# Patient Record
Sex: FEMALE | Race: WHITE | NOT HISPANIC OR LATINO | Employment: OTHER | URBAN - NONMETROPOLITAN AREA
[De-identification: names, ages, dates, MRNs, and addresses within clinical notes are randomized per-mention and may not be internally consistent; named-entity substitution may affect disease eponyms.]

---

## 2018-05-17 ENCOUNTER — INITIAL PRENATAL (OUTPATIENT)
Dept: OBSTETRICS AND GYNECOLOGY | Facility: CLINIC | Age: 43
End: 2018-05-17

## 2018-05-17 ENCOUNTER — APPOINTMENT (OUTPATIENT)
Dept: LAB | Facility: HOSPITAL | Age: 43
End: 2018-05-17

## 2018-05-17 VITALS
WEIGHT: 171.6 LBS | SYSTOLIC BLOOD PRESSURE: 138 MMHG | DIASTOLIC BLOOD PRESSURE: 76 MMHG | HEIGHT: 69 IN | BODY MASS INDEX: 25.42 KG/M2

## 2018-05-17 DIAGNOSIS — O09.522 ELDERLY MULTIGRAVIDA IN SECOND TRIMESTER: ICD-10-CM

## 2018-05-17 DIAGNOSIS — Z3A.14 14 WEEKS GESTATION OF PREGNANCY: ICD-10-CM

## 2018-05-17 DIAGNOSIS — Z64.1 GRAND MULTIPARA: Chronic | ICD-10-CM

## 2018-05-17 DIAGNOSIS — Z32.01 PREGNANCY EXAMINATION OR TEST, POSITIVE RESULT: Primary | ICD-10-CM

## 2018-05-17 DIAGNOSIS — O99.012 ANEMIA DURING PREGNANCY IN SECOND TRIMESTER: ICD-10-CM

## 2018-05-17 PROBLEM — O09.529 AMA (ADVANCED MATERNAL AGE) MULTIGRAVIDA 35+: Status: ACTIVE | Noted: 2018-05-17

## 2018-05-17 PROBLEM — O99.019 ANEMIA OF PREGNANCY: Status: ACTIVE | Noted: 2018-05-17

## 2018-05-17 LAB
ABO GROUP BLD: NORMAL
ALBUMIN SERPL-MCNC: 4.2 G/DL (ref 3.4–4.8)
ALBUMIN/GLOB SERPL: 1.2 G/DL (ref 1.1–1.8)
ALP SERPL-CCNC: 37 U/L (ref 38–126)
ALT SERPL W P-5'-P-CCNC: 32 U/L (ref 9–52)
AMPHET+METHAMPHET UR QL: NEGATIVE
ANION GAP SERPL CALCULATED.3IONS-SCNC: 9 MMOL/L (ref 5–15)
AST SERPL-CCNC: 23 U/L (ref 14–36)
BARBITURATES UR QL SCN: NEGATIVE
BASOPHILS # BLD AUTO: 0.01 10*3/MM3 (ref 0–0.2)
BASOPHILS NFR BLD AUTO: 0.1 % (ref 0–2)
BENZODIAZ UR QL SCN: NEGATIVE
BILIRUB SERPL-MCNC: 0.2 MG/DL (ref 0.2–1.3)
BILIRUB UR QL STRIP: NEGATIVE
BLD GP AB SCN SERPL QL: NEGATIVE
BUN BLD-MCNC: 13 MG/DL (ref 7–21)
BUN/CREAT SERPL: 22.8 (ref 7–25)
CALCIUM SPEC-SCNC: 10 MG/DL (ref 8.4–10.2)
CANNABINOIDS SERPL QL: NEGATIVE
CHLORIDE SERPL-SCNC: 102 MMOL/L (ref 95–110)
CLARITY UR: ABNORMAL
CO2 SERPL-SCNC: 26 MMOL/L (ref 22–31)
COCAINE UR QL: NEGATIVE
COLOR UR: YELLOW
CREAT BLD-MCNC: 0.57 MG/DL (ref 0.5–1)
DEPRECATED RDW RBC AUTO: 39 FL (ref 36.4–46.3)
EOSINOPHIL # BLD AUTO: 0.07 10*3/MM3 (ref 0–0.7)
EOSINOPHIL NFR BLD AUTO: 0.9 % (ref 0–7)
ERYTHROCYTE [DISTWIDTH] IN BLOOD BY AUTOMATED COUNT: 13 % (ref 11.5–14.5)
GFR SERPL CREATININE-BSD FRML MDRD: 116 ML/MIN/1.73 (ref 60–135)
GLOBULIN UR ELPH-MCNC: 3.4 GM/DL (ref 2.3–3.5)
GLUCOSE BLD-MCNC: 100 MG/DL (ref 60–100)
GLUCOSE UR STRIP-MCNC: NEGATIVE MG/DL
HCG INTACT+B SERPL-ACNC: ABNORMAL MIU/ML
HCT VFR BLD AUTO: 36 % (ref 35–45)
HGB BLD-MCNC: 12.7 G/DL (ref 12–15.5)
HGB UR QL STRIP.AUTO: NEGATIVE
IMM GRANULOCYTES # BLD: 0.03 10*3/MM3 (ref 0–0.02)
IMM GRANULOCYTES NFR BLD: 0.4 % (ref 0–0.5)
KETONES UR QL STRIP: NEGATIVE
LEUKOCYTE ESTERASE UR QL STRIP.AUTO: ABNORMAL
LYMPHOCYTES # BLD AUTO: 1.66 10*3/MM3 (ref 0.6–4.2)
LYMPHOCYTES NFR BLD AUTO: 20.7 % (ref 10–50)
Lab: NORMAL
MCH RBC QN AUTO: 29.1 PG (ref 26.5–34)
MCHC RBC AUTO-ENTMCNC: 35.3 G/DL (ref 31.4–36)
MCV RBC AUTO: 82.4 FL (ref 80–98)
METHADONE UR QL SCN: NEGATIVE
MONOCYTES # BLD AUTO: 0.6 10*3/MM3 (ref 0–0.9)
MONOCYTES NFR BLD AUTO: 7.5 % (ref 0–12)
NEUTROPHILS # BLD AUTO: 5.65 10*3/MM3 (ref 2–8.6)
NEUTROPHILS NFR BLD AUTO: 70.4 % (ref 37–80)
NITRITE UR QL STRIP: NEGATIVE
OPIATES UR QL: NEGATIVE
OXYCODONE UR QL SCN: NEGATIVE
PH UR STRIP.AUTO: 6.5 [PH] (ref 5–9)
PLATELET # BLD AUTO: 258 10*3/MM3 (ref 150–450)
PMV BLD AUTO: 9 FL (ref 8–12)
POTASSIUM BLD-SCNC: 3.8 MMOL/L (ref 3.5–5.1)
PROT SERPL-MCNC: 7.6 G/DL (ref 6.3–8.6)
PROT UR QL STRIP: NEGATIVE
RBC # BLD AUTO: 4.37 10*6/MM3 (ref 3.77–5.16)
RH BLD: POSITIVE
SODIUM BLD-SCNC: 137 MMOL/L (ref 137–145)
SP GR UR STRIP: 1.02 (ref 1–1.03)
TSH SERPL DL<=0.05 MIU/L-ACNC: 1.53 MIU/ML (ref 0.46–4.68)
UROBILINOGEN UR QL STRIP: ABNORMAL
WBC NRBC COR # BLD: 8.02 10*3/MM3 (ref 3.2–9.8)

## 2018-05-17 PROCEDURE — 80307 DRUG TEST PRSMV CHEM ANLYZR: CPT | Performed by: OBSTETRICS & GYNECOLOGY

## 2018-05-17 PROCEDURE — G0432 EIA HIV-1/HIV-2 SCREEN: HCPCS | Performed by: OBSTETRICS & GYNECOLOGY

## 2018-05-17 PROCEDURE — 81003 URINALYSIS AUTO W/O SCOPE: CPT | Performed by: OBSTETRICS & GYNECOLOGY

## 2018-05-17 PROCEDURE — 86803 HEPATITIS C AB TEST: CPT | Performed by: OBSTETRICS & GYNECOLOGY

## 2018-05-17 PROCEDURE — 87086 URINE CULTURE/COLONY COUNT: CPT | Performed by: OBSTETRICS & GYNECOLOGY

## 2018-05-17 PROCEDURE — 84443 ASSAY THYROID STIM HORMONE: CPT | Performed by: OBSTETRICS & GYNECOLOGY

## 2018-05-17 PROCEDURE — 84702 CHORIONIC GONADOTROPIN TEST: CPT | Performed by: OBSTETRICS & GYNECOLOGY

## 2018-05-17 PROCEDURE — 99203 OFFICE O/P NEW LOW 30 MIN: CPT | Performed by: OBSTETRICS & GYNECOLOGY

## 2018-05-17 PROCEDURE — 80053 COMPREHEN METABOLIC PANEL: CPT | Performed by: OBSTETRICS & GYNECOLOGY

## 2018-05-17 PROCEDURE — 36415 COLL VENOUS BLD VENIPUNCTURE: CPT | Performed by: OBSTETRICS & GYNECOLOGY

## 2018-05-17 RX ORDER — FERROUS SULFATE 325(65) MG
325 TABLET ORAL
Qty: 90 TABLET | Refills: 11 | Status: SHIPPED | OUTPATIENT
Start: 2018-05-17 | End: 2020-02-20

## 2018-05-17 RX ORDER — PRENATAL VIT NO.126/IRON/FOLIC 28MG-0.8MG
TABLET ORAL DAILY
COMMUNITY

## 2018-05-18 LAB
BACTERIA SPEC AEROBE CULT: NORMAL
HBV SURFACE AG SERPL QL IA: NEGATIVE
HCV AB SER DONR QL: NEGATIVE
HIV1+2 AB SER QL: NEGATIVE
RPR SER QL: NORMAL
RUBV IGG SER QL: ABNORMAL
RUBV IGG SER-ACNC: 21 IU/ML (ref 0–9.9)

## 2018-05-18 NOTE — PROGRESS NOTES
Chief Complaint   Patient presents with   • High Risk Gestation       Nuha Bridges is a 43 y.o. year old G(14)P(12)01(12).  Patient's last menstrual period was 02/07/2018 (exact date).  She presents to be seen to initiate prenatal care.  All of her births been uncomplicated vaginal deliveries at home.    Last week she felt dizziness and weakness and faintness.  She had an unwell feeling that was different from her usual pregnancy discomforts.    We discussed checking some lab work and taking iron supplementation.  We also discussed treatments for constipation.    She will get the blood work today, May 17, 2018, and follow-up in 4 weeks.  If she is feeling better at that point and her lab work is normal, she plans on a home birth.    Smoking status: Never Smoker                                                              Smokeless tobacco: Never Used                          The following portions of the patient's history were reviewed and updated as appropriate:vital signs, allergies, current medications, past family history, past medical history, past social history, past surgical history and problem list.    Lab Review   No data reviewed    Imaging   No data reviewed    Assessment/Plan   ASSESSMENT  1. IUP at 14w1d  Nuah was seen today for high risk gestation.    Diagnoses and all orders for this visit:    Pregnancy examination or test, positive result  -     OB Panel With HIV  -     Urinalysis - Urine, Clean Catch  -     Urine Culture - Urine, Urine, Clean Catch  -     Urine Drug Screen - Urine, Clean Catch  -     TSH  -     Comprehensive Metabolic Panel  -     hCG, Quantitative, Pregnancy  -     RPR  -     CBC Auto Differential  -     Hepatitis B Surface Antigen  -     Rubella Antibody, IgG  -     OB Panel Type & Screen  -     HIV-1 & HIV-2 Antibodies  -     Hepatitis C Antibody  -     PREVIOUS HISTORY; Future  -     PREVIOUS HISTORY    14 weeks gestation of pregnancy    Elderly multigravida in second  trimester    Grand multipara    Anemia during pregnancy in second trimester    Other orders  -     ferrous sulfate 325 (65 FE) MG tablet; Take 1 tablet by mouth 3 (Three) Times a Day With Meals.        PLAN  1. Tests ordered today:  Orders Placed This Encounter   Procedures   • Urine Culture - Urine, Urine, Clean Catch   • OB Panel With HIV   • Urinalysis - Urine, Clean Catch   • Urine Drug Screen - Urine, Clean Catch   • TSH   • Comprehensive Metabolic Panel   • hCG, Quantitative, Pregnancy   • RPR   • CBC Auto Differential   • Hepatitis B Surface Antigen   • Rubella Antibody, IgG   • HIV-1 & HIV-2 Antibodies   • Hepatitis C Antibody   • OB Panel Type & Screen   • PREVIOUS HISTORY     Standing Status:   Future     Number of Occurrences:   1     Standing Expiration Date:   5/17/2019     2. Medications prescribed today:  New Medications Ordered This Visit   Medications   • ferrous sulfate 325 (65 FE) MG tablet     Sig: Take 1 tablet by mouth 3 (Three) Times a Day With Meals.     Dispense:  90 tablet     Refill:  11       Follow up: 4 week(s)       This note was electronically signed.    Aly Veloz MD  May 17, 2018

## 2018-06-11 ENCOUNTER — ROUTINE PRENATAL (OUTPATIENT)
Dept: OBSTETRICS AND GYNECOLOGY | Facility: CLINIC | Age: 43
End: 2018-06-11

## 2018-06-11 VITALS — WEIGHT: 171 LBS | DIASTOLIC BLOOD PRESSURE: 70 MMHG | SYSTOLIC BLOOD PRESSURE: 106 MMHG | BODY MASS INDEX: 25.25 KG/M2

## 2018-06-11 DIAGNOSIS — Z64.1 GRAND MULTIPARA: Chronic | ICD-10-CM

## 2018-06-11 DIAGNOSIS — O99.012 ANEMIA DURING PREGNANCY IN SECOND TRIMESTER: ICD-10-CM

## 2018-06-11 DIAGNOSIS — Z3A.17 17 WEEKS GESTATION OF PREGNANCY: Primary | ICD-10-CM

## 2018-06-11 DIAGNOSIS — O09.522 ELDERLY MULTIGRAVIDA IN SECOND TRIMESTER: ICD-10-CM

## 2018-06-11 PROCEDURE — 99213 OFFICE O/P EST LOW 20 MIN: CPT | Performed by: OBSTETRICS & GYNECOLOGY

## 2018-06-11 NOTE — PROGRESS NOTES
Chief Complaint   Patient presents with   • OB Follow up   • High Risk Gestation   Nuha Bridges is a 43 y.o. year old G(14)P(12)01(12).  Patient's last menstrual period was 02/07/2018 (exact date).  She presents to be seen to initiate prenatal care.  All of her births been uncomplicated vaginal deliveries at home.     Last week she felt dizziness and weakness and faintness.  She had an unwell feeling that was different from her usual pregnancy discomforts.     We discussed checking some lab work and taking iron supplementation.  We also discussed treatments for constipation.     She will get the blood work today, May 17, 2018, and follow-up in 4 weeks.  If she is feeling better at that point and her lab work is normal, she plans on a home birth.    Lab work May 17, 2018 normal CMP with random glucose 100.  Normal TSH at 1.53.  Urine drug screen negative.  Hepatitis C antibody negative.  Hepatitis B surface antigen negative.  Rubella immune.  Blood type A+.  Antibody screen negative.  RPR nonreactive.  HIV negative.  White blood cell count 8.0 to, hemoglobin 12.7, hematocrit 36.0, MCV 82.4, platelets 258,000.  I reviewed these results with the patient and her .    June 11, 2018, she is taking the iron and is feeling better.  They plan on a home birth.  All of her previous birth segment uncomplicated vaginal deliveries at home.  This couple voices understanding that that does not guarantee that this will be an uncomplicated on birth, and grand multiparity increases risk of abnormal fetal presentation at time of delivery as well as postpartum hemorrhage.    There to follow-up.  As needed.     Smoking status: Never Smoker                                                               Smokeless tobacco: Never Used                             The patient complains of the following: No complaints    ROS  Vaginal bleeding: No   Nausea: No   Diarrhea: No   Constipation: No   Other:      Lab Results   Component Value  Date    ABO A 05/17/2018    RH Positive 05/17/2018    ABSCRN Negative 05/17/2018       Specific topics discussed at today's visit:Lab results, advanced maternal age, and grand multiparity  Tests done today: none    Nuha was seen today for ob follow up and high risk gestation.    Diagnoses and all orders for this visit:    17 weeks gestation of pregnancy    Elderly multigravida in second trimester    Grand multipara    Anemia during pregnancy in second trimester

## 2018-11-20 ENCOUNTER — HOSPITAL ENCOUNTER (INPATIENT)
Facility: HOSPITAL | Age: 43
LOS: 1 days | Discharge: HOME | End: 2018-11-21
Attending: OBSTETRICS & GYNECOLOGY | Admitting: OBSTETRICS & GYNECOLOGY

## 2018-11-20 ENCOUNTER — ANESTHESIA EVENT (OUTPATIENT)
Dept: LABOR AND DELIVERY | Facility: HOSPITAL | Age: 43
End: 2018-11-20

## 2018-11-20 ENCOUNTER — ANESTHESIA (OUTPATIENT)
Dept: LABOR AND DELIVERY | Facility: HOSPITAL | Age: 43
End: 2018-11-20

## 2018-11-20 LAB
ABO GROUP BLD: NORMAL
ALBUMIN SERPL-MCNC: 4.1 G/DL (ref 3.4–4.8)
ALBUMIN/GLOB SERPL: 1.2 G/DL (ref 1.1–1.8)
ALP SERPL-CCNC: 200 U/L (ref 38–126)
ALT SERPL W P-5'-P-CCNC: 10 U/L (ref 9–52)
AMPHET+METHAMPHET UR QL: NEGATIVE
ANION GAP SERPL CALCULATED.3IONS-SCNC: 14 MMOL/L (ref 5–15)
AST SERPL-CCNC: 22 U/L (ref 14–36)
BARBITURATES UR QL SCN: NEGATIVE
BASOPHILS # BLD AUTO: 0.01 10*3/MM3 (ref 0–0.2)
BASOPHILS NFR BLD AUTO: 0.1 % (ref 0–2)
BENZODIAZ UR QL SCN: NEGATIVE
BILIRUB SERPL-MCNC: 0.6 MG/DL (ref 0.2–1.3)
BLD GP AB SCN SERPL QL: NEGATIVE
BUN BLD-MCNC: 11 MG/DL (ref 7–21)
BUN/CREAT SERPL: 18.6 (ref 7–25)
CALCIUM SPEC-SCNC: 9.4 MG/DL (ref 8.4–10.2)
CANNABINOIDS SERPL QL: NEGATIVE
CHLORIDE SERPL-SCNC: 100 MMOL/L (ref 95–110)
CO2 SERPL-SCNC: 18 MMOL/L (ref 22–31)
COCAINE UR QL: NEGATIVE
CREAT BLD-MCNC: 0.59 MG/DL (ref 0.5–1)
CREAT UR-MCNC: 100.9 MG/DL
DEPRECATED RDW RBC AUTO: 40.7 FL (ref 36.4–46.3)
EOSINOPHIL # BLD AUTO: 0.02 10*3/MM3 (ref 0–0.7)
EOSINOPHIL NFR BLD AUTO: 0.2 % (ref 0–7)
ERYTHROCYTE [DISTWIDTH] IN BLOOD BY AUTOMATED COUNT: 13.1 % (ref 11.5–14.5)
GFR SERPL CREATININE-BSD FRML MDRD: 111 ML/MIN/1.73 (ref 58–135)
GLOBULIN UR ELPH-MCNC: 3.3 GM/DL (ref 2.3–3.5)
GLUCOSE BLD-MCNC: 75 MG/DL (ref 60–100)
HCT VFR BLD AUTO: 37.6 % (ref 35–45)
HGB BLD-MCNC: 13.4 G/DL (ref 12–15.5)
IMM GRANULOCYTES # BLD: 0.02 10*3/MM3 (ref 0–0.02)
IMM GRANULOCYTES NFR BLD: 0.2 % (ref 0–0.5)
LYMPHOCYTES # BLD AUTO: 1.8 10*3/MM3 (ref 0.6–4.2)
LYMPHOCYTES NFR BLD AUTO: 17.5 % (ref 10–50)
Lab: NORMAL
MCH RBC QN AUTO: 30.6 PG (ref 26.5–34)
MCHC RBC AUTO-ENTMCNC: 35.6 G/DL (ref 31.4–36)
MCV RBC AUTO: 85.8 FL (ref 80–98)
METHADONE UR QL SCN: NEGATIVE
MONOCYTES # BLD AUTO: 0.93 10*3/MM3 (ref 0–0.9)
MONOCYTES NFR BLD AUTO: 9 % (ref 0–12)
NEUTROPHILS # BLD AUTO: 7.52 10*3/MM3 (ref 2–8.6)
NEUTROPHILS NFR BLD AUTO: 73 % (ref 37–80)
OPIATES UR QL: NEGATIVE
OXYCODONE UR QL SCN: NEGATIVE
PLATELET # BLD AUTO: 189 10*3/MM3 (ref 150–450)
PMV BLD AUTO: 11 FL (ref 8–12)
POTASSIUM BLD-SCNC: 3.5 MMOL/L (ref 3.5–5.1)
PROT SERPL-MCNC: 7.4 G/DL (ref 6.3–8.6)
PROT UR-MCNC: 24.3 MG/DL
PROT/CREAT UR: 240.8 MG/G CREA (ref 0–200)
RBC # BLD AUTO: 4.38 10*6/MM3 (ref 3.77–5.16)
RH BLD: POSITIVE
SODIUM BLD-SCNC: 132 MMOL/L (ref 137–145)
T&S EXPIRATION DATE: NORMAL
WBC NRBC COR # BLD: 10.3 10*3/MM3 (ref 3.2–9.8)

## 2018-11-20 PROCEDURE — 94799 UNLISTED PULMONARY SVC/PX: CPT

## 2018-11-20 PROCEDURE — 51703 INSERT BLADDER CATH COMPLEX: CPT

## 2018-11-20 PROCEDURE — 59515 CESAREAN DELIVERY: CPT | Performed by: OBSTETRICS & GYNECOLOGY

## 2018-11-20 PROCEDURE — 25010000002 ONDANSETRON PER 1 MG: Performed by: NURSE ANESTHETIST, CERTIFIED REGISTERED

## 2018-11-20 PROCEDURE — 86900 BLOOD TYPING SEROLOGIC ABO: CPT | Performed by: OBSTETRICS & GYNECOLOGY

## 2018-11-20 PROCEDURE — 25010000002 PHENYLEPHRINE PER 1 ML: Performed by: NURSE ANESTHETIST, CERTIFIED REGISTERED

## 2018-11-20 PROCEDURE — 25010000002 METHOCARBAMOL 1000 MG/10ML SOLUTION 10 ML VIAL: Performed by: STUDENT IN AN ORGANIZED HEALTH CARE EDUCATION/TRAINING PROGRAM

## 2018-11-20 PROCEDURE — 80307 DRUG TEST PRSMV CHEM ANLYZR: CPT | Performed by: OBSTETRICS & GYNECOLOGY

## 2018-11-20 PROCEDURE — 85025 COMPLETE CBC W/AUTO DIFF WBC: CPT | Performed by: OBSTETRICS & GYNECOLOGY

## 2018-11-20 PROCEDURE — 86901 BLOOD TYPING SEROLOGIC RH(D): CPT | Performed by: OBSTETRICS & GYNECOLOGY

## 2018-11-20 PROCEDURE — 25010000002 KETOROLAC TROMETHAMINE PER 15 MG: Performed by: STUDENT IN AN ORGANIZED HEALTH CARE EDUCATION/TRAINING PROGRAM

## 2018-11-20 PROCEDURE — 25010000002 MORPHINE PER 10 MG: Performed by: NURSE ANESTHETIST, CERTIFIED REGISTERED

## 2018-11-20 PROCEDURE — 80053 COMPREHEN METABOLIC PANEL: CPT | Performed by: STUDENT IN AN ORGANIZED HEALTH CARE EDUCATION/TRAINING PROGRAM

## 2018-11-20 PROCEDURE — 94760 N-INVAS EAR/PLS OXIMETRY 1: CPT

## 2018-11-20 PROCEDURE — 82570 ASSAY OF URINE CREATININE: CPT | Performed by: STUDENT IN AN ORGANIZED HEALTH CARE EDUCATION/TRAINING PROGRAM

## 2018-11-20 PROCEDURE — 84156 ASSAY OF PROTEIN URINE: CPT | Performed by: STUDENT IN AN ORGANIZED HEALTH CARE EDUCATION/TRAINING PROGRAM

## 2018-11-20 PROCEDURE — 86850 RBC ANTIBODY SCREEN: CPT | Performed by: OBSTETRICS & GYNECOLOGY

## 2018-11-20 RX ORDER — TRISODIUM CITRATE DIHYDRATE AND CITRIC ACID MONOHYDRATE 500; 334 MG/5ML; MG/5ML
30 SOLUTION ORAL ONCE
Status: COMPLETED | OUTPATIENT
Start: 2018-11-20 | End: 2018-11-20

## 2018-11-20 RX ORDER — IBUPROFEN 800 MG/1
800 TABLET ORAL EVERY 8 HOURS
Qty: 60 TABLET | Refills: 12 | Status: SHIPPED | OUTPATIENT
Start: 2018-11-20 | End: 2020-02-20

## 2018-11-20 RX ORDER — CELECOXIB 200 MG/1
400 CAPSULE ORAL ONCE
Status: COMPLETED | OUTPATIENT
Start: 2018-11-20 | End: 2018-11-20

## 2018-11-20 RX ORDER — HYDROMORPHONE HCL 110MG/55ML
0.5 PATIENT CONTROLLED ANALGESIA SYRINGE INTRAVENOUS
Status: DISCONTINUED | OUTPATIENT
Start: 2018-11-20 | End: 2018-11-21 | Stop reason: HOSPADM

## 2018-11-20 RX ORDER — METOCLOPRAMIDE 10 MG/1
10 TABLET ORAL ONCE
Status: COMPLETED | OUTPATIENT
Start: 2018-11-20 | End: 2018-11-20

## 2018-11-20 RX ORDER — HYDROCODONE BITARTRATE AND ACETAMINOPHEN 5; 325 MG/1; MG/1
1 TABLET ORAL EVERY 4 HOURS PRN
Qty: 30 TABLET | Refills: 0 | Status: SHIPPED | OUTPATIENT
Start: 2018-11-20 | End: 2020-02-20

## 2018-11-20 RX ORDER — ONDANSETRON 2 MG/ML
4 INJECTION INTRAMUSCULAR; INTRAVENOUS EVERY 6 HOURS PRN
Status: DISCONTINUED | OUTPATIENT
Start: 2018-11-20 | End: 2018-11-21 | Stop reason: HOSPADM

## 2018-11-20 RX ORDER — DOCUSATE SODIUM 100 MG/1
100 CAPSULE, LIQUID FILLED ORAL 2 TIMES DAILY
Status: DISCONTINUED | OUTPATIENT
Start: 2018-11-20 | End: 2018-11-21 | Stop reason: HOSPADM

## 2018-11-20 RX ORDER — LANOLIN 100 %
OINTMENT (GRAM) TOPICAL
Status: DISCONTINUED | OUTPATIENT
Start: 2018-11-20 | End: 2018-11-21 | Stop reason: HOSPADM

## 2018-11-20 RX ORDER — ONDANSETRON 4 MG/1
4 TABLET, ORALLY DISINTEGRATING ORAL EVERY 6 HOURS PRN
Status: DISCONTINUED | OUTPATIENT
Start: 2018-11-20 | End: 2018-11-21 | Stop reason: HOSPADM

## 2018-11-20 RX ORDER — SODIUM CHLORIDE 0.9 % (FLUSH) 0.9 %
3 SYRINGE (ML) INJECTION EVERY 12 HOURS SCHEDULED
Status: DISCONTINUED | OUTPATIENT
Start: 2018-11-20 | End: 2018-11-20 | Stop reason: HOSPADM

## 2018-11-20 RX ORDER — DIPHENHYDRAMINE HCL 25 MG
25 CAPSULE ORAL EVERY 4 HOURS PRN
Status: DISCONTINUED | OUTPATIENT
Start: 2018-11-20 | End: 2018-11-21 | Stop reason: HOSPADM

## 2018-11-20 RX ORDER — SODIUM CHLORIDE, SODIUM LACTATE, POTASSIUM CHLORIDE, CALCIUM CHLORIDE 600; 310; 30; 20 MG/100ML; MG/100ML; MG/100ML; MG/100ML
INJECTION, SOLUTION INTRAVENOUS
Status: COMPLETED
Start: 2018-11-20 | End: 2018-11-20

## 2018-11-20 RX ORDER — BUPIVACAINE HYDROCHLORIDE 7.5 MG/ML
INJECTION, SOLUTION EPIDURAL; RETROBULBAR AS NEEDED
Status: DISCONTINUED | OUTPATIENT
Start: 2018-11-20 | End: 2018-11-20 | Stop reason: SURG

## 2018-11-20 RX ORDER — ONDANSETRON 4 MG/1
4 TABLET, FILM COATED ORAL EVERY 6 HOURS PRN
Status: DISCONTINUED | OUTPATIENT
Start: 2018-11-20 | End: 2018-11-21 | Stop reason: HOSPADM

## 2018-11-20 RX ORDER — CARBOPROST TROMETHAMINE 250 UG/ML
250 INJECTION, SOLUTION INTRAMUSCULAR AS NEEDED
Status: DISCONTINUED | OUTPATIENT
Start: 2018-11-20 | End: 2018-11-20 | Stop reason: HOSPADM

## 2018-11-20 RX ORDER — ACETAMINOPHEN 325 MG/1
650 TABLET ORAL EVERY 4 HOURS PRN
Status: DISCONTINUED | OUTPATIENT
Start: 2018-11-20 | End: 2018-11-21 | Stop reason: HOSPADM

## 2018-11-20 RX ORDER — PROMETHAZINE HYDROCHLORIDE 25 MG/1
25 TABLET ORAL EVERY 6 HOURS PRN
Status: DISCONTINUED | OUTPATIENT
Start: 2018-11-20 | End: 2018-11-21 | Stop reason: HOSPADM

## 2018-11-20 RX ORDER — LANOLIN 100 %
OINTMENT (GRAM) TOPICAL AS NEEDED
Status: DISCONTINUED | OUTPATIENT
Start: 2018-11-20 | End: 2018-11-21 | Stop reason: HOSPADM

## 2018-11-20 RX ORDER — OXYTOCIN/RINGER'S LACTATE 20/1000 ML
PLASTIC BAG, INJECTION (ML) INTRAVENOUS AS NEEDED
Status: DISCONTINUED | OUTPATIENT
Start: 2018-11-20 | End: 2018-11-20 | Stop reason: SURG

## 2018-11-20 RX ORDER — BUTORPHANOL TARTRATE 1 MG/ML
2 INJECTION, SOLUTION INTRAMUSCULAR; INTRAVENOUS
Status: DISCONTINUED | OUTPATIENT
Start: 2018-11-20 | End: 2018-11-21 | Stop reason: HOSPADM

## 2018-11-20 RX ORDER — KETOROLAC TROMETHAMINE 30 MG/ML
30 INJECTION, SOLUTION INTRAMUSCULAR; INTRAVENOUS EVERY 6 HOURS
Status: DISCONTINUED | OUTPATIENT
Start: 2018-11-20 | End: 2018-11-21 | Stop reason: HOSPADM

## 2018-11-20 RX ORDER — LIDOCAINE HYDROCHLORIDE 10 MG/ML
5 INJECTION, SOLUTION EPIDURAL; INFILTRATION; INTRACAUDAL; PERINEURAL AS NEEDED
Status: DISCONTINUED | OUTPATIENT
Start: 2018-11-20 | End: 2018-11-20 | Stop reason: HOSPADM

## 2018-11-20 RX ORDER — BUPIVACAINE HCL/0.9 % NACL/PF 0.1 %
2 PLASTIC BAG, INJECTION (ML) EPIDURAL ONCE
Status: DISCONTINUED | OUTPATIENT
Start: 2018-11-20 | End: 2018-11-21 | Stop reason: HOSPADM

## 2018-11-20 RX ORDER — ZOLPIDEM TARTRATE 5 MG/1
5 TABLET ORAL NIGHTLY PRN
Status: DISCONTINUED | OUTPATIENT
Start: 2018-11-20 | End: 2018-11-21 | Stop reason: HOSPADM

## 2018-11-20 RX ORDER — OXYTOCIN 10 [USP'U]/ML
INJECTION, SOLUTION INTRAMUSCULAR; INTRAVENOUS AS NEEDED
Status: DISCONTINUED | OUTPATIENT
Start: 2018-11-20 | End: 2018-11-20 | Stop reason: SURG

## 2018-11-20 RX ORDER — HYDROXYZINE HYDROCHLORIDE 25 MG/1
50 TABLET, FILM COATED ORAL EVERY 6 HOURS PRN
Status: DISCONTINUED | OUTPATIENT
Start: 2018-11-20 | End: 2018-11-21 | Stop reason: HOSPADM

## 2018-11-20 RX ORDER — CALCIUM CARBONATE 200(500)MG
1 TABLET,CHEWABLE ORAL EVERY 6 HOURS PRN
Status: DISCONTINUED | OUTPATIENT
Start: 2018-11-20 | End: 2018-11-21 | Stop reason: HOSPADM

## 2018-11-20 RX ORDER — MORPHINE SULFATE 1 MG/ML
INJECTION, SOLUTION EPIDURAL; INTRATHECAL; INTRAVENOUS AS NEEDED
Status: DISCONTINUED | OUTPATIENT
Start: 2018-11-20 | End: 2018-11-20 | Stop reason: SURG

## 2018-11-20 RX ORDER — SODIUM CHLORIDE, SODIUM LACTATE, POTASSIUM CHLORIDE, CALCIUM CHLORIDE 600; 310; 30; 20 MG/100ML; MG/100ML; MG/100ML; MG/100ML
125 INJECTION, SOLUTION INTRAVENOUS CONTINUOUS
Status: DISCONTINUED | OUTPATIENT
Start: 2018-11-20 | End: 2018-11-21 | Stop reason: HOSPADM

## 2018-11-20 RX ORDER — METHYLERGONOVINE MALEATE 0.2 MG/ML
200 INJECTION INTRAVENOUS ONCE AS NEEDED
Status: DISCONTINUED | OUTPATIENT
Start: 2018-11-20 | End: 2018-11-20 | Stop reason: HOSPADM

## 2018-11-20 RX ORDER — ONDANSETRON 2 MG/ML
4 INJECTION INTRAMUSCULAR; INTRAVENOUS ONCE AS NEEDED
Status: DISCONTINUED | OUTPATIENT
Start: 2018-11-20 | End: 2018-11-21 | Stop reason: HOSPADM

## 2018-11-20 RX ORDER — MISOPROSTOL 200 UG/1
800 TABLET ORAL AS NEEDED
Status: DISCONTINUED | OUTPATIENT
Start: 2018-11-20 | End: 2018-11-20 | Stop reason: HOSPADM

## 2018-11-20 RX ORDER — IBUPROFEN 800 MG/1
800 TABLET ORAL EVERY 8 HOURS SCHEDULED
Status: DISCONTINUED | OUTPATIENT
Start: 2018-11-20 | End: 2018-11-21 | Stop reason: HOSPADM

## 2018-11-20 RX ORDER — SODIUM CHLORIDE 0.9 % (FLUSH) 0.9 %
3-10 SYRINGE (ML) INJECTION AS NEEDED
Status: DISCONTINUED | OUTPATIENT
Start: 2018-11-20 | End: 2018-11-20 | Stop reason: HOSPADM

## 2018-11-20 RX ORDER — PROMETHAZINE HYDROCHLORIDE 25 MG/ML
12.5 INJECTION, SOLUTION INTRAMUSCULAR; INTRAVENOUS EVERY 6 HOURS PRN
Status: DISCONTINUED | OUTPATIENT
Start: 2018-11-20 | End: 2018-11-21 | Stop reason: HOSPADM

## 2018-11-20 RX ORDER — EPHEDRINE SULFATE 50 MG/ML
INJECTION, SOLUTION INTRAVENOUS AS NEEDED
Status: DISCONTINUED | OUTPATIENT
Start: 2018-11-20 | End: 2018-11-20 | Stop reason: SURG

## 2018-11-20 RX ORDER — PROMETHAZINE HYDROCHLORIDE 12.5 MG/1
12.5 SUPPOSITORY RECTAL EVERY 6 HOURS PRN
Status: DISCONTINUED | OUTPATIENT
Start: 2018-11-20 | End: 2018-11-21 | Stop reason: HOSPADM

## 2018-11-20 RX ORDER — MISOPROSTOL 200 UG/1
600 TABLET ORAL ONCE
Status: DISCONTINUED | OUTPATIENT
Start: 2018-11-20 | End: 2018-11-21 | Stop reason: HOSPADM

## 2018-11-20 RX ORDER — CELECOXIB 200 MG/1
CAPSULE ORAL
Status: COMPLETED
Start: 2018-11-20 | End: 2018-11-20

## 2018-11-20 RX ORDER — BUPIVACAINE HCL/0.9 % NACL/PF 0.1 %
2 PLASTIC BAG, INJECTION (ML) EPIDURAL EVERY 8 HOURS
Status: CANCELLED | OUTPATIENT
Start: 2018-11-21 | End: 2018-11-21

## 2018-11-20 RX ORDER — BISACODYL 10 MG
10 SUPPOSITORY, RECTAL RECTAL DAILY PRN
Status: DISCONTINUED | OUTPATIENT
Start: 2018-11-20 | End: 2018-11-21 | Stop reason: HOSPADM

## 2018-11-20 RX ORDER — ONDANSETRON 2 MG/ML
INJECTION INTRAMUSCULAR; INTRAVENOUS AS NEEDED
Status: DISCONTINUED | OUTPATIENT
Start: 2018-11-20 | End: 2018-11-20 | Stop reason: SURG

## 2018-11-20 RX ORDER — BISACODYL 5 MG/1
10 TABLET, DELAYED RELEASE ORAL DAILY PRN
Status: DISCONTINUED | OUTPATIENT
Start: 2018-11-20 | End: 2018-11-21 | Stop reason: HOSPADM

## 2018-11-20 RX ORDER — BUPIVACAINE HCL/0.9 % NACL/PF 0.1 %
2 PLASTIC BAG, INJECTION (ML) EPIDURAL EVERY 8 HOURS
Status: DISCONTINUED | OUTPATIENT
Start: 2018-11-21 | End: 2018-11-21 | Stop reason: HOSPADM

## 2018-11-20 RX ORDER — HYDROCODONE BITARTRATE AND ACETAMINOPHEN 5; 325 MG/1; MG/1
1 TABLET ORAL EVERY 4 HOURS PRN
Status: DISCONTINUED | OUTPATIENT
Start: 2018-11-20 | End: 2018-11-21 | Stop reason: HOSPADM

## 2018-11-20 RX ORDER — SIMETHICONE 80 MG
80 TABLET,CHEWABLE ORAL 4 TIMES DAILY PRN
Status: DISCONTINUED | OUTPATIENT
Start: 2018-11-20 | End: 2018-11-21 | Stop reason: HOSPADM

## 2018-11-20 RX ORDER — OXYTOCIN/RINGER'S LACTATE 20/1000 ML
PLASTIC BAG, INJECTION (ML) INTRAVENOUS
Status: COMPLETED
Start: 2018-11-20 | End: 2018-11-20

## 2018-11-20 RX ORDER — PHENOL 1.4 %
600 AEROSOL, SPRAY (ML) MUCOUS MEMBRANE DAILY
COMMUNITY

## 2018-11-20 RX ORDER — NALOXONE HCL 0.4 MG/ML
0.1 VIAL (ML) INJECTION
Status: DISCONTINUED | OUTPATIENT
Start: 2018-11-20 | End: 2018-11-21 | Stop reason: HOSPADM

## 2018-11-20 RX ADMIN — PHENYLEPHRINE HYDROCHLORIDE 50 MCG: 10 INJECTION INTRAVENOUS at 18:01

## 2018-11-20 RX ADMIN — KETOROLAC TROMETHAMINE 30 MG: 30 INJECTION, SOLUTION INTRAMUSCULAR; INTRAVENOUS at 21:58

## 2018-11-20 RX ADMIN — CELECOXIB 400 MG: 200 CAPSULE ORAL at 17:28

## 2018-11-20 RX ADMIN — SODIUM CITRATE AND CITRIC ACID MONOHYDRATE 30 ML: 500; 334 SOLUTION ORAL at 17:34

## 2018-11-20 RX ADMIN — Medication 100 ML: at 18:10

## 2018-11-20 RX ADMIN — PHENYLEPHRINE HYDROCHLORIDE 100 MCG: 10 INJECTION INTRAVENOUS at 18:43

## 2018-11-20 RX ADMIN — Medication 0.2 MG: at 17:49

## 2018-11-20 RX ADMIN — EPHEDRINE SULFATE 10 MG: 50 INJECTION INTRAVENOUS at 18:43

## 2018-11-20 RX ADMIN — METOCLOPRAMIDE HYDROCHLORIDE 10 MG: 10 TABLET ORAL at 21:58

## 2018-11-20 RX ADMIN — SODIUM CHLORIDE, POTASSIUM CHLORIDE, SODIUM LACTATE AND CALCIUM CHLORIDE: 600; 310; 30; 20 INJECTION, SOLUTION INTRAVENOUS at 17:39

## 2018-11-20 RX ADMIN — ONDANSETRON 4 MG: 2 INJECTION INTRAMUSCULAR; INTRAVENOUS at 17:39

## 2018-11-20 RX ADMIN — PHENYLEPHRINE HYDROCHLORIDE 100 MCG: 10 INJECTION INTRAVENOUS at 18:23

## 2018-11-20 RX ADMIN — DOCUSATE SODIUM 100 MG: 100 CAPSULE, LIQUID FILLED ORAL at 21:58

## 2018-11-20 RX ADMIN — PHENYLEPHRINE HYDROCHLORIDE 50 MCG: 10 INJECTION INTRAVENOUS at 17:55

## 2018-11-20 RX ADMIN — Medication 100 ML: at 18:47

## 2018-11-20 RX ADMIN — PHENYLEPHRINE HYDROCHLORIDE 100 MCG: 10 INJECTION INTRAVENOUS at 18:19

## 2018-11-20 RX ADMIN — PHENYLEPHRINE HYDROCHLORIDE 100 MCG: 10 INJECTION INTRAVENOUS at 18:37

## 2018-11-20 RX ADMIN — SODIUM CHLORIDE, POTASSIUM CHLORIDE, SODIUM LACTATE AND CALCIUM CHLORIDE 999 ML/HR: 600; 310; 30; 20 INJECTION, SOLUTION INTRAVENOUS at 17:29

## 2018-11-20 RX ADMIN — BUPIVACAINE HYDROCHLORIDE 1.5 ML: 7.5 INJECTION, SOLUTION EPIDURAL; RETROBULBAR at 17:49

## 2018-11-20 RX ADMIN — IBUPROFEN 800 MG: 800 TABLET ORAL at 21:58

## 2018-11-20 RX ADMIN — EPHEDRINE SULFATE 5 MG: 50 INJECTION INTRAVENOUS at 18:32

## 2018-11-20 RX ADMIN — PHENYLEPHRINE HYDROCHLORIDE 100 MCG: 10 INJECTION INTRAVENOUS at 18:34

## 2018-11-20 RX ADMIN — PHENYLEPHRINE HYDROCHLORIDE 100 MCG: 10 INJECTION INTRAVENOUS at 18:31

## 2018-11-20 RX ADMIN — PHENYLEPHRINE HYDROCHLORIDE 100 MCG: 10 INJECTION INTRAVENOUS at 18:24

## 2018-11-20 RX ADMIN — EPHEDRINE SULFATE 5 MG: 50 INJECTION INTRAVENOUS at 18:34

## 2018-11-20 RX ADMIN — EPHEDRINE SULFATE 10 MG: 50 INJECTION INTRAVENOUS at 18:40

## 2018-11-20 RX ADMIN — METHOCARBAMOL 1000 MG: 100 INJECTION INTRAMUSCULAR; INTRAVENOUS at 21:51

## 2018-11-20 RX ADMIN — EPHEDRINE SULFATE 10 MG: 50 INJECTION INTRAVENOUS at 18:37

## 2018-11-20 RX ADMIN — Medication 100 ML: at 18:25

## 2018-11-20 RX ADMIN — Medication 1 APPLICATION: at 22:00

## 2018-11-20 RX ADMIN — PHENYLEPHRINE HYDROCHLORIDE 100 MCG: 10 INJECTION INTRAVENOUS at 18:27

## 2018-11-20 RX ADMIN — PHENYLEPHRINE HYDROCHLORIDE 50 MCG: 10 INJECTION INTRAVENOUS at 18:17

## 2018-11-20 RX ADMIN — PHENYLEPHRINE HYDROCHLORIDE 100 MCG: 10 INJECTION INTRAVENOUS at 18:18

## 2018-11-20 RX ADMIN — PHENYLEPHRINE HYDROCHLORIDE 50 MCG: 10 INJECTION INTRAVENOUS at 17:51

## 2018-11-20 RX ADMIN — SODIUM CHLORIDE, POTASSIUM CHLORIDE, SODIUM LACTATE AND CALCIUM CHLORIDE 1000 ML: 600; 310; 30; 20 INJECTION, SOLUTION INTRAVENOUS at 16:45

## 2018-11-20 RX ADMIN — OXYTOCIN 20 UNITS: 10 INJECTION INTRAVENOUS at 18:17

## 2018-11-20 RX ADMIN — PHENYLEPHRINE HYDROCHLORIDE 100 MCG: 10 INJECTION INTRAVENOUS at 18:40

## 2018-11-20 RX ADMIN — PHENYLEPHRINE HYDROCHLORIDE 200 MCG: 10 INJECTION INTRAVENOUS at 18:29

## 2018-11-20 RX ADMIN — PHENYLEPHRINE HYDROCHLORIDE 100 MCG: 10 INJECTION INTRAVENOUS at 18:21

## 2018-11-20 NOTE — ANESTHESIA PROCEDURE NOTES
Spinal Block    Pre-sedation assessment completed: 11/20/2018 5:40 PM    Patient reassessed immediately prior to procedure    Patient location during procedure: OR  Performed By  CRNA: Kalus Quiroz CRNA  Preanesthetic Checklist  Completed: patient identified, site marked, surgical consent, pre-op evaluation, timeout performed, IV checked, risks and benefits discussed and monitors and equipment checked  Spinal Block Prep:  Patient Position:sitting  Sterile Tech:cap, gloves, sterile barriers and mask  Prep:Chloraprep  Patient Monitoring:EKG, continuous pulse oximetry and blood pressure monitoring  Spinal Block Procedure  Approach:midline  Guidance:landmark technique and palpation technique  Location:L4-L5  Needle Type:Pencan  Needle Gauge:24 G  Placement of Spinal needle event:cerebrospinal fluid aspirated  Paresthesia: no  Fluid Appearance:clear     Post Assessment  Patient Tolerance:patient tolerated the procedure well with no apparent complications  Complications no  Additional Notes  1 unsuccessful attempt per rufina Rodriguez prior to success x1 per anthony Quiroz crna

## 2018-11-20 NOTE — H&P
[facility]  Obstetric History and Physical    Chief Complaint   Patient presents with   • breech     came in for c/s due to breech baby       Subjective     Patient is a 43 y.o. female  currently at 40w6d, who presents for breech presentation found on ultrasound this morning. She states that she started having contractions earlier this morning, they are irregular and rare. (+) FM. Pt has previously had uncomplicated vaginal births at home. She states that nausea/vomiting have been well controlled since first trimester, endorses good appetite. Denies any headaches, abdominal pain, or bleeding.      Her prenatal care is complicated by advanced maternal age, grand multipara, and anemia during pregnancy in second trimester. Pt has been seeing a midwife during this pregnancy, she was last seen by Dr. Veloz at 17 weeks gestation at which point they planned for a home birth. Pt and family were made aware of the risks and complications of this due to pt's age and grand multipara status.    Her previous obstetric/gynecological history is remarkable for macrosomic infants.    The following portions of the patients history were reviewed and updated as appropriate: current medications, allergies, past medical history, past surgical history, past family history, past social history and problem list .                                             Review of Systems   Constitutional: Negative for chills, diaphoresis and fever.   HENT: Negative for facial swelling, rhinorrhea, sneezing and sore throat.    Eyes: Negative for discharge and redness.   Respiratory: Negative for cough, choking, chest tightness and shortness of breath.    Cardiovascular: Negative for chest pain and leg swelling.   Gastrointestinal: Negative for abdominal pain, constipation, diarrhea, nausea and vomiting.   Genitourinary: Negative for dysuria, frequency and urgency.   Skin: Negative for rash and wound.   Neurological: Negative for facial  asymmetry, speech difficulty and headaches.   Psychiatric/Behavioral: Negative for agitation, behavioral problems and decreased concentration.        General ROS: Pertinent items are noted in HPI    Objective     Prenatal Information:  Prenatal Results     Initial Prenatal Labs     Test Value Reference Range Date Time    Hemoglobin 12.7 g/dL 12.0 - 15.5 g/dL 05/17/18 1423    Hematocrit 36.0 % 35.0 - 45.0 % 05/17/18 1423    Platelets 258 10*3/mm3 150 - 450 10*3/mm3 05/17/18 1423    Rubella IgG 21.0 IU/mL 0.0 - 9.9 IU/mL 05/17/18 1423      Immune  Immune 05/17/18 1423    Hepatitis B SAg Negative  Negative 05/17/18 1423    Hepatitis C Ab Negative  Negative 05/17/18 1423    RPR Non-Reactive  Non-Reactive 05/17/18 1423    ABO A   05/17/18 1423    Rh Positive   05/17/18 1423    Antibody Screen Negative   05/17/18 1423    HIV Negative  Negative 05/17/18 1423    Urine Culture Mixed Mary Alice Isolated   05/17/18 1423    Gonorrhea        Chlamydia        TSH 1.530 mIU/mL 0.460 - 4.680 mIU/mL 05/17/18 1423          2nd and 3rd Trimester     Test Value Reference Range Date Time    Hemoglobin (repeated)        Hematocrit (repeated)        GCT        Antibody Screen (repeated)        GTT Fasting        GTT 1 Hr        GTT 2 Hr        GTT 3 Hr        Group B Strep              Drug Screening     Test Value Reference Range Date Time    Amphetamine Screen Negative  Negative 05/17/18 1423    Barbiturate Screen Negative  Negative 05/17/18 1423    Benzodiazepine Screen Negative  Negative 05/17/18 1423    Methadone Screen Negative  Negative 05/17/18 1423    Phencyclidine Screen        Opiates Screen Negative  Negative 05/17/18 1423    THC Screen Negative  Negative 05/17/18 1423    Cocaine Screen Negative  Negative 05/17/18 1423    Propoxyphene Screen        Buprenorphine Screen        Methamphetamine Screen        Oxycodone Screen Negative  Negative 05/17/18 1423    Tryicyclic Antidepressants Screen              Other (Risk screening)      Test Value Reference Range Date Time    Varicella IgG        Parvovirus IgG        CMV IgG        Cystic Fibrosis        Hemoglobin electrophoresis        NIPT        MSAFP-4        AFP (for NTD only)                  External Prenatal Results     Pregnancy Outside Results - Transcribed From Office Records - See Scanned Records For Details     Test Value Date Time    Hgb 12.7 g/dL 18 1423    Hct 36.0 % 18 1423    ABO A  18 1423    Rh Positive  18 1423    Antibody Screen Negative  18 1423    Glucose Fasting GTT       Glucose Tolerance Test 1 hour       Glucose Tolerance Test 3 hour       Gonorrhea (discrete)       Chlamydia (discrete)       RPR Non-Reactive  18 1423    VDRL       Syphilis Antibody       Rubella 21.0 IU/mL 18 1423      Immune  18 1423    HBsAg Negative  18 1423    Herpes Simplex Virus PCR       Herpes Simplex VIrus Culture       HIV Negative  18 1423    Hep C RNA Quant PCR       Hep C Antibody Negative  18 1423    AFP       Group B Strep       GBS Susceptibility to Clindamycin       GBS Susceptibility to Erythromycin       Fetal Fibronectin       Genetic Testing, Maternal Blood             Drug Screening     Test Value Date Time    Urine Drug Screen       Amphetamine Screen Negative  18 1423    Barbiturate Screen Negative  18 1423    Benzodiazepine Screen Negative  18 1423    Methadone Screen Negative  18 1423    Phencyclidine Screen       Opiates Screen Negative  18 1423    THC Screen Negative  18 1423    Cocaine Screen       Propoxyphene Screen       Buprenorphine Screen       Methamphetamine Screen       Oxycodone Screen Negative  18 1423    Tricyclic Antidepressants Screen                    Past OB History:     Obstetric History      T12     L12    SAB1   TAB0   Ectopic0   Molar0   Multiple0   Live [id number]      # Outcome Date GA Lbr Otto/2nd Weight Sex Delivery Anes PTL Lv    14 Current            13 Term 11/08/16 40w0d  4082 g (9 lb) F Vag-Spont   JONA      Name: [place]   12 Term 03/19/15 40w0d  4082 g (9 lb) F Vag-Spont   JOAN      Name: Pennie   11 Term 01/01/14 40w0d  4082 g (9 lb) F Vag-Spont   JOAN      Name: Linda   10 Term 07/09/12 40w0d  4593 g (10 lb 2 oz) M Vag-Spont   JOAN      Name: Edwin   9 Term 12/30/10 39w0d  3629 g (8 lb) M Vag-Spont   JOAN      Name: Lupillo   8 Term 11/02/08 41w0d  4082 g (9 lb) M Vag-Spont   JOAN      Name: Kameron   7 Term 05/04/07 40w0d  3544 g (7 lb 13 oz) F Vag-Spont   JOAN      Name: Elsy   6 Term 11/28/05 39w0d  3175 g (7 lb) F Vag-Spont   JOAN      Name: Cari   5 Term 12/20/03 40w0d  3600 g (7 lb 15 oz) M Vag-Spont   JOAN      Name: Ivan   4 Term 08/17/02 40w0d  3856 g (8 lb 8 oz) F Vag-Spont   JOAN      Name: Olga   3 Term 01/25/01 40w0d  3629 g (8 lb) F Vag-Spont   JOAN      Name: Jovanna   2 Term 08/17/99 40w0d  3544 g (7 lb 13 oz) M Vag-Spont   JOAN      Name: Grupo   1 SAB 05/17/98                   ALLERGIES:   No Known Allergies     Home Medications:     Prior to Admission medications    Medication Sig Start Date End Date Taking? Authorizing Provider   ferrous sulfate 325 (65 FE) MG tablet Take 1 tablet by mouth 3 (Three) Times a Day With Meals. 5/17/18   Aly Veloz MD   Magnesium Carbonate 250 MG/GM powder Take 1 each by mouth Daily.    Ady Wheeler MD   Prenatal Vit-Fe Fumarate-FA (PRENATAL, CLASSIC, VITAMIN) 28-0.8 MG tablet tablet Take  by mouth Daily.    Ady Wheeler MD       Past Medical History: Past Medical History:   Diagnosis Date   • AMA (advanced maternal age) multigravida 35+ 5/17/2018   • Anemia of pregnancy 5/17/2018   • Anxiety    • Grand multipara 5/17/2018      Past Surgical History History reviewed. No pertinent surgical history.   Family History: Family History   Problem Relation Age of Onset   • Hypertension Father    • Diabetes Mother    • Hypertension Mother       Social History:  reports  that  has never smoked. she has never used smokeless tobacco.   reports that she does not drink alcohol.   reports that she does not use drugs.               Vital Signs Range for the last 24 hours  Temperature: Temp:  [97.9 °F (36.6 °C)] 97.9 °F (36.6 °C)   Temp Source: Temp src: Oral   BP: BP: (145)/(74) 145/74   Pulse: Heart Rate:  [109] 109   Respirations: Resp:  [18] 18   SPO2: SpO2:  [98 %] 98 %   O2 Amount (l/min):     O2 Devices Device (Oxygen Therapy): room air   Weight: Weight:  [83.9 kg (185 lb)] 83.9 kg (185 lb)       OBGyn Exam  Physical Examination: General appearance - alert, well appearing, and in no distress  Mental status - alert, oriented to person, place, and time  Neck - supple, no significant adenopathy  Chest - clear to auscultation, no wheezes, rales or rhonchi, symmetric air entry  Heart - normal rate, regular rhythm, normal S1, S2, no murmurs, rubs, clicks or gallops  Abdomen - Gravid and nontender  Extremities - peripheral pulses normal, no pedal edema, no clubbing or cyanosis    Presentation: Breech   Cervix: Exam by:     Dilation:     Effacement:     Station:         Assessment/Plan       Transabdominal US: Infant in breech presentation  NST: , no accelerations, moderate variability, early decels.   Assessment:  1. Nuha Bridges 1975 G14P(12)01(12)with an Intrauterine pregnancy at 40W6D who presented today due to breech presentation found on U/S this morning. Her pregnancy has been complicated by advanced maternal age, grand multipara status, and anemia in second trimester.  2. GBS status unknown for current pregnancy  3. Category 1 tracing    GBS status:   No results found for requested labs within last 90 days.       Plan:  1. Admit to labor and delivery on 2018 for  due to breech presentation  2. Plan of care has been reviewed with staff, and patient.  3. Risks, benefits of treatment plan have been discussed.  4. All questions have been  answered.            This document has been electronically signed by Steve Abarca MD on November 20, 2018 4:31 PM

## 2018-11-21 VITALS
TEMPERATURE: 98.5 F | BODY MASS INDEX: 27.32 KG/M2 | DIASTOLIC BLOOD PRESSURE: 55 MMHG | HEART RATE: 92 BPM | RESPIRATION RATE: 18 BRPM | WEIGHT: 185 LBS | SYSTOLIC BLOOD PRESSURE: 109 MMHG | OXYGEN SATURATION: 96 %

## 2018-11-21 LAB
BASOPHILS # BLD AUTO: 0.01 10*3/MM3 (ref 0–0.2)
BASOPHILS NFR BLD AUTO: 0.1 % (ref 0–2)
DEPRECATED RDW RBC AUTO: 42 FL (ref 36.4–46.3)
EOSINOPHIL # BLD AUTO: 0.01 10*3/MM3 (ref 0–0.7)
EOSINOPHIL NFR BLD AUTO: 0.1 % (ref 0–7)
ERYTHROCYTE [DISTWIDTH] IN BLOOD BY AUTOMATED COUNT: 13.1 % (ref 11.5–14.5)
HCT VFR BLD AUTO: 32.4 % (ref 35–45)
HGB BLD-MCNC: 11.3 G/DL (ref 12–15.5)
IMM GRANULOCYTES # BLD: 0.02 10*3/MM3 (ref 0–0.02)
IMM GRANULOCYTES NFR BLD: 0.2 % (ref 0–0.5)
LYMPHOCYTES # BLD AUTO: 1.26 10*3/MM3 (ref 0.6–4.2)
LYMPHOCYTES NFR BLD AUTO: 13 % (ref 10–50)
MCH RBC QN AUTO: 30.4 PG (ref 26.5–34)
MCHC RBC AUTO-ENTMCNC: 34.9 G/DL (ref 31.4–36)
MCV RBC AUTO: 87.1 FL (ref 80–98)
MONOCYTES # BLD AUTO: 0.8 10*3/MM3 (ref 0–0.9)
MONOCYTES NFR BLD AUTO: 8.3 % (ref 0–12)
NEUTROPHILS # BLD AUTO: 7.57 10*3/MM3 (ref 2–8.6)
NEUTROPHILS NFR BLD AUTO: 78.3 % (ref 37–80)
PLATELET # BLD AUTO: 171 10*3/MM3 (ref 150–450)
PMV BLD AUTO: 11 FL (ref 8–12)
RBC # BLD AUTO: 3.72 10*6/MM3 (ref 3.77–5.16)
WBC NRBC COR # BLD: 9.67 10*3/MM3 (ref 3.2–9.8)

## 2018-11-21 PROCEDURE — 25010000002 KETOROLAC TROMETHAMINE PER 15 MG: Performed by: STUDENT IN AN ORGANIZED HEALTH CARE EDUCATION/TRAINING PROGRAM

## 2018-11-21 PROCEDURE — 25010000002 METHOCARBAMOL 1000 MG/10ML SOLUTION 10 ML VIAL: Performed by: STUDENT IN AN ORGANIZED HEALTH CARE EDUCATION/TRAINING PROGRAM

## 2018-11-21 PROCEDURE — 85025 COMPLETE CBC W/AUTO DIFF WBC: CPT | Performed by: STUDENT IN AN ORGANIZED HEALTH CARE EDUCATION/TRAINING PROGRAM

## 2018-11-21 RX ORDER — KETOROLAC TROMETHAMINE 30 MG/ML
30 INJECTION, SOLUTION INTRAMUSCULAR; INTRAVENOUS ONCE
Status: DISCONTINUED | OUTPATIENT
Start: 2018-11-21 | End: 2018-11-21 | Stop reason: HOSPADM

## 2018-11-21 RX ORDER — SODIUM CHLORIDE 9 MG/ML
125 INJECTION, SOLUTION INTRAVENOUS CONTINUOUS
Status: DISCONTINUED | OUTPATIENT
Start: 2018-11-21 | End: 2018-11-21 | Stop reason: HOSPADM

## 2018-11-21 RX ADMIN — KETOROLAC TROMETHAMINE 30 MG: 30 INJECTION, SOLUTION INTRAMUSCULAR; INTRAVENOUS at 04:46

## 2018-11-21 RX ADMIN — IBUPROFEN 800 MG: 800 TABLET ORAL at 05:00

## 2018-11-21 RX ADMIN — Medication 2 G: at 02:30

## 2018-11-21 RX ADMIN — SODIUM CHLORIDE 125 ML/HR: 900 INJECTION, SOLUTION INTRAVENOUS at 02:30

## 2018-11-21 RX ADMIN — KETOROLAC TROMETHAMINE 30 MG: 30 INJECTION, SOLUTION INTRAMUSCULAR; INTRAVENOUS at 09:53

## 2018-11-21 RX ADMIN — METHOCARBAMOL 1000 MG: 100 INJECTION INTRAMUSCULAR; INTRAVENOUS at 04:46

## 2018-11-21 RX ADMIN — SIMETHICONE CHEW TAB 80 MG 80 MG: 80 TABLET ORAL at 04:59

## 2018-11-21 NOTE — DISCHARGE SUMMARY
[facility]  Nuha Bridges  : 1975  MRN: [id number]  CSN: [id number]    Discharge Summary      Date of Admission: 2018   Date of Discharge:  2018   Principle Discharge Dx: 1. PLTCS for breech presentation   Procedures Performed: Procedure(s):   SECTION PRIMARY   Brief History: Patient is a 43 y.o. now  who presented to labor and delivery after finding of infant in breech presentation on U/S. Pregnancy was  Complicated by advanced maternal age and grand multipara status. GBS status was unknown at presentation.   Hospital Course: Her post-operative course was unremarkable.  On POD # 1 she expressed the desire for D/C. She had no febrile morbidity. She had passed gas, and was urinating normally. She was eating a regular diet without difficulty. She was ambulating well. Her incision was clean, dry and intact. Discharge instructions were given.  All questions were answered   Pending Studies:   Diet:  Discharge Condition: none  Regular  Stable   Discharge Activity: Vaginal rest for 6 weeks.  Other activity as tolerated.   Discharge Medications:    Your medication list      START taking these medications      Instructions Last Dose Given Next Dose Due   HYDROcodone-acetaminophen 5-325 MG per tablet  Commonly known as:  NORCO      Take 1 tablet by mouth Every 4 (Four) Hours As Needed for Moderate Pain .       ibuprofen 800 MG tablet  Commonly known as:  ADVIL,MOTRIN      Take 1 tablet by mouth Every 8 (Eight) Hours.       ibuprofen 800 MG tablet  Commonly known as:  ADVIL,MOTRIN      Take 1 tablet by mouth Every 8 (Eight) Hours.          CONTINUE taking these medications      Instructions Last Dose Given Next Dose Due   calcium carbonate 600 MG tablet  Commonly known as:  OS-MICHAEL      Take 600 mg by mouth Daily.       ferrous sulfate 325 (65 FE) MG tablet      Take 1 tablet by mouth 3 (Three) Times a Day With Meals.       Magnesium Carbonate 250 MG/GM powder      Take 1 each by mouth  Daily.       prenatal (CLASSIC) vitamin 28-0.8 MG tablet tablet      Take  by mouth Daily.             Where to Get Your Medications      These medications were sent to [facility] - [place] [address]    Hours:  Monday through Friday 7:00am to 5:00pm Phone:  [phone] ·   ibuprofen 800 MG tablet     You can get these medications from any pharmacy    Bring a paper prescription for each of these medications  · HYDROcodone-acetaminophen 5-325 MG per tablet  · ibuprofen 800 MG tablet        Discharge Disposition: home     Follow-up: Dr Veloz in 1 week       This note has been electronically signed.          This document has been electronically signed by Steve Abarca MD on November 21, 2018 7:21 AM

## 2018-11-21 NOTE — PLAN OF CARE
Problem: Patient Care Overview  Goal: Plan of Care Review  Outcome: Ongoing (interventions implemented as appropriate)   18 0412   Coping/Psychosocial   Plan of Care Reviewed With patient;spouse   OTHER   Outcome Summary vss, stood at bedside at 6 hours, will need catheter taken out at 12 hours, output good, fundus firm with bleeding light.      Goal: Individualization and Mutuality  Outcome: Ongoing (interventions implemented as appropriate)    Goal: Discharge Needs Assessment  Outcome: Ongoing (interventions implemented as appropriate)    Goal: Interprofessional Rounds/Family Conf  Outcome: Ongoing (interventions implemented as appropriate)      Problem: Anesthesia/Analgesia, Neuraxial (Obstetrics)  Goal: Signs and Symptoms of Listed Potential Problems Will be Absent, Minimized or Managed (Anesthesia/Analgesia, Neuraxial)  Outcome: Ongoing (interventions implemented as appropriate)      Problem: Fall Risk,  (Adult,Obstetrics,Pediatric)  Goal: Identify Related Risk Factors and Signs and Symptoms  Outcome: Ongoing (interventions implemented as appropriate)    Goal: Absence of Maternal Fall  Outcome: Ongoing (interventions implemented as appropriate)    Goal: Absence of [place] Fall/Drop  Outcome: Ongoing (interventions implemented as appropriate)      Problem: Postpartum ( Delivery) (Adult,Obstetrics,Pediatric)  Goal: Signs and Symptoms of Listed Potential Problems Will be Absent, Minimized or Managed (Postpartum)  Outcome: Ongoing (interventions implemented as appropriate)    Goal: Anesthesia/Sedation Recovery  Outcome: Ongoing (interventions implemented as appropriate)      Problem: Breastfeeding (Adult,Obstetrics,Pediatric)  Goal: Signs and Symptoms of Listed Potential Problems Will be Absent, Minimized or Managed (Breastfeeding)  Outcome: Ongoing (interventions implemented as appropriate)

## 2018-11-21 NOTE — ANESTHESIA POSTPROCEDURE EVALUATION
Patient: Nuha Bridges    Procedure Summary     Date:  18 Room / Location:  [facility] LABOR DELIVERY    Anesthesia Start:   Anesthesia Stop:      Procedure:   SECTION PRIMARY (N/A Abdomen) Diagnosis:  (breech)    Surgeon:  Aly Veloz MD Provider:  Klaus Quiroz CRNA    Anesthesia Type:  spinal ASA Status:  3          Anesthesia Type: spinal  Last vitals  BP   145/74 (18 1535)   Temp   97.9 °F (36.6 °C) (18 1535)   Pulse   109 (18 1535)   Resp   18 (18 1612)     SpO2   98 % (18 1535)     Post Anesthesia Care and Evaluation    Patient location during evaluation: PACU  Level of consciousness: awake and awake and alert  Pain score: 0  Pain management: adequate  Airway patency: patent  Anesthetic complications: No anesthetic complications  PONV Status: none  Cardiovascular status: acceptable and hemodynamically stable  Respiratory status: acceptable and spontaneous ventilation  Hydration status: acceptable

## 2018-11-21 NOTE — DISCHARGE INSTR - ACTIVITY
"Notify Dr of... heavy bleeding, passing clots, foul odor to your discharge, temperature above 100.4, burning when urinating, gapping or drainage from incision or episiotomy, or for pain not relieved by taking pain medication, redness or streaking in breasts, pain or redness in legs.    Take all medications as prescribed.  Continue taking iron or prenatal vitamin while breastfeeding or until you run out.  Take rest periods several times during the day.  \"Baby Blues\" are normal and may be present around the 3rd-4th day after delivery. If they last longer than 2-3 days, please let your Dr know.  Pelvic rest for 6 weeks. No douching, tampons, or intercourse  No driving for 2 weeks  No lifting anything heavier than the baby  Wear a good supportive bra 24 hours/day to prevent engorgement        "

## 2018-11-21 NOTE — OP NOTE
Section Procedure Note    Nuha Bridges    2018     Indications: malpresentation: breech    Pre-operative Diagnosis: 43 year old G14 now P(13)01(13) with garcia gestation at 40w6d complicated by breech presentation.     Post-operative Diagnosis: breech presentation, now status post primary  section    PROCEDURES PERFORMED: Procedure(s):   SECTION PRIMARY    SURGEON: Aly Veloz MD, FACOG    RESIDENT SURGEON:  Ady Bennett MD R3      STAFF:   Circulator: Jessica Velasco RN  Scrub Person: Simi Benitez  L & D Nurse: Ernestine Huffman RN; Anna Gaspar RN  NICU Nurse: Ely Delgadillo RN    ANESTHESIA: Spinal    ANESTHESIA STAFF:  CRNA: Klaus Quiroz CRNA  Student Nurse Anesthetist: Sheri Moore SRNA    Findings: Male infant Ken in breech presentation. Enlarged uterus. Normal appearing ovaries, fallopian tubes bilaterally.     Birth Information  YOB: 2018   Time of birth: 6:09 PM   Delivering clinician:     Sex: male   Delivery type: , Low Transverse   Breech type (if applicable):     Observed anomalies/comments:         Estimated Blood Loss:  1200           Drains: Guo                 Specimens: None               Complications:  None; patient tolerated the procedure well.           Disposition: Mother Baby Unit           Condition: stable    Procedure Details   After informed consent was obtained by discussing the risks, benefits, complications, treatment options, alternatives,  and expected outcomes of the . And after the patient agreed with the proposed plan, the operative consent was signed and in order, the patient was taken to the Operating Room, identified as Nuha Bridges and the procedure verified as  Delivery.    After induction of anesthesia, the patient was prepped and draped in the usual sterile manner.  A Time Out was performed.      A Pfannenstiel incision was then made and carried sharply down through the  subcutaneous tissue to the fascia. Fascial incision was made and extended transversely. The fascia was  from the underlying rectus tissue superiorly and inferiorly.  The rectus muscles were  in the midline. The peritoneum was entered bluntly.       A low transverse uterine incision was made. Delivered from breech presentation was a male infant Ken.      After the umbilical cord was clamped and cut, cord blood was obtained for evaluation. The placenta was removed intact and appeared normal. The uterus, tubes and ovaries appeared normal. The uterine incision was closed with a running locked sutures of #1 Chromic. Hemostasis was observed.     The rectus muscles were loosely approximated in the midline with #1Chromic suture. The fascia was then closed using 0 Vicryl. Further hemostasis was obtained with electrocautery.  Skin was closed with 4-0 Nylon vertical mattress sutures.    Instrument, sponge, and needle counts were correct prior the abdominal closure and at the conclusion of the case.             This document has been electronically signed by Aly Veloz MD on November 20, 2018 7:20 PM

## 2018-11-21 NOTE — PROGRESS NOTES
[facility]  Nuha Bridges  : 1975  MRN: [id number]  CSN: [id number]    Post-operative Day #1  Subjective   Pt is a 44 yo G(14)P(13)01(13) with PLTCS for infant in breech presentation. GBS status was unknown at presentation. Pregnancy was complicated by advanced maternal age and grand multipara. Her pain is well controlled.  Vaginal bleeding is appropriate amount.  She is passing gas and has not had a bowel movement. Ambulating around room and to the bathroom. Voiding well. Pt is doing well today, on 2018 pt expressed her desire to go home.     Objective     Min/max vitals past 24 hours:   Temp  Min: 97.3 °F (36.3 °C)  Max: 98.4 °F (36.9 °C)  BP  Min: 104/72  Max: 145/74  Pulse  Min: 81  Max: 114  Pulse  Min: 81  Max: 114        General: well developed; well nourished  no acute distress   Abdomen: soft, non-tender; no masses  no umbilical or inginual hernias are present  no hepato-splenomegaly  incision is intact   Pelvic: Not performed   Ext: Calves NT     Lab Results   Component Value Date    WBC 9.67 2018    HGB 11.3 (L) 2018    HCT 32.4 (L) 2018    MCV 87.1 2018     2018    RH Positive 2018    HEPBSAG Negative 2018        Assessment   1. POD #1 S/P Primary LTCS for infant in breech presentation. GBS status was unknown at presentation and protocol was followed. Pregnancy was complicated by advanced maternal age and grand multipara status.     Plan   1. Continue routine postpartum care  2. Continue routine post-operative care  3. Ambulate  4. Advance diet  5. Discharge to home  6. D/C questions all answered            This document has been electronically signed by Steve Abarca MD on 2018 7:18 AM

## 2018-11-21 NOTE — L&D DELIVERY NOTE
Section Procedure Note    Nuha Bridges    2018     Indications: malpresentation: breech    Pre-operative Diagnosis: 43 year old G14 now P(13)01(13) with garcia gestation at 40w6d complicated by breech presentation. Delivered via primary  section at 2018    Post-operative Diagnosis: breech presentation, now status post primary  section    PROCEDURES PERFORMED: Procedure(s):   SECTION PRIMARY    SURGEON: Aly Veloz MD, FACOG    RESIDENT SURGEON:  Ady Bennett MD R3      STAFF:   Circulator: Jessica Velasco RN  Scrub Person: Simi Benitez  L & D Nurse: Ernestine Huffman RN; Anna Gaspar RN  NICU Nurse: Ely Delgadillo RN    ANESTHESIA: Spinal    ANESTHESIA STAFF:  CRNA: Klaus Quiroz CRNA  Student Nurse Anesthetist: Sheri Moore SRNA    Findings: Male infant Ken in breech presentation. Enlarged uterus. Normal appearing ovaries, fallopian tubes bilaterally.     Birth Information  YOB: 2018   Time of birth: 6:09 PM   Delivering clinician:     Sex: male   Delivery type: , Low Transverse   Breech type (if applicable):     Observed anomalies/comments:         Estimated Blood Loss:  1200           Drains: Guo                 Specimens: None               Complications:  None; patient tolerated the procedure well.           Disposition: Mother Baby Unit           Condition: stable    Procedure Details   After informed consent was obtained by discussing the risks, benefits, complications, treatment options, alternatives,  and expected outcomes of the . And after the patient agreed with the proposed plan, the operative consent was signed and in order, the patient was taken to the Operating Room, identified as Nuha Bridges and the procedure verified as  Delivery.    After induction of anesthesia, the patient was prepped and draped in the usual sterile manner.  A Time Out was performed.      A Pfannenstiel incision was  then made and carried sharply down through the subcutaneous tissue to the fascia. Fascial incision was made and extended transversely. The fascia was  from the underlying rectus tissue superiorly and inferiorly.  The rectus muscles were  in the midline. The peritoneum was entered bluntly.       A low transverse uterine incision was made. Delivered from breech presentation was a male infant Ken.      After the umbilical cord was clamped and cut, cord blood was obtained for evaluation. The placenta was removed intact and appeared normal. The uterus, tubes and ovaries appeared normal. The uterine incision was closed with a running locked sutures of #1 Chromic. Hemostasis was observed.     The rectus muscles were loosely approximated in the midline with #1Chromic suture. The fascia was then closed using 0 Vicryl. Further hemostasis was obtained with electrocautery.  Skin was closed with 4-0 Nylon vertical mattress sutures.    Instrument, sponge, and needle counts were correct prior the abdominal closure and at the conclusion of the case.

## 2020-02-20 ENCOUNTER — INITIAL PRENATAL (OUTPATIENT)
Dept: OBSTETRICS AND GYNECOLOGY | Facility: CLINIC | Age: 45
End: 2020-02-20

## 2020-02-20 ENCOUNTER — LAB (OUTPATIENT)
Dept: LAB | Facility: HOSPITAL | Age: 45
End: 2020-02-20

## 2020-02-20 VITALS — WEIGHT: 178 LBS | BODY MASS INDEX: 26.29 KG/M2 | SYSTOLIC BLOOD PRESSURE: 137 MMHG | DIASTOLIC BLOOD PRESSURE: 80 MMHG

## 2020-02-20 DIAGNOSIS — Z32.01 PREGNANCY EXAMINATION OR TEST, POSITIVE RESULT: ICD-10-CM

## 2020-02-20 DIAGNOSIS — N91.2 AMENORRHEA: Primary | ICD-10-CM

## 2020-02-20 DIAGNOSIS — O09.522 MULTIGRAVIDA OF ADVANCED MATERNAL AGE IN SECOND TRIMESTER: ICD-10-CM

## 2020-02-20 DIAGNOSIS — Z64.1 GRAND MULTIPARA: Chronic | ICD-10-CM

## 2020-02-20 PROBLEM — Z98.891 HISTORY OF CESAREAN DELIVERY: Status: ACTIVE | Noted: 2020-02-20

## 2020-02-20 PROBLEM — O99.019 ANEMIA OF PREGNANCY: Status: RESOLVED | Noted: 2018-05-17 | Resolved: 2020-02-20

## 2020-02-20 LAB
AMPHET+METHAMPHET UR QL: NEGATIVE
AMPHETAMINES UR QL: NEGATIVE
B-HCG UR QL: POSITIVE
BACTERIA UR QL AUTO: ABNORMAL /HPF
BARBITURATES UR QL SCN: NEGATIVE
BENZODIAZ UR QL SCN: NEGATIVE
BILIRUB UR QL STRIP: NEGATIVE
BUPRENORPHINE SERPL-MCNC: NEGATIVE NG/ML
CANNABINOIDS SERPL QL: NEGATIVE
CLARITY UR: ABNORMAL
COCAINE UR QL: NEGATIVE
COLOR UR: YELLOW
GLUCOSE UR STRIP-MCNC: NEGATIVE MG/DL
HGB UR QL STRIP.AUTO: ABNORMAL
HYALINE CASTS UR QL AUTO: ABNORMAL /LPF
INTERNAL NEGATIVE CONTROL: NEGATIVE
INTERNAL POSITIVE CONTROL: POSITIVE
KETONES UR QL STRIP: ABNORMAL
LEUKOCYTE ESTERASE UR QL STRIP.AUTO: NEGATIVE
Lab: ABNORMAL
METHADONE UR QL SCN: NEGATIVE
NITRITE UR QL STRIP: NEGATIVE
OPIATES UR QL: NEGATIVE
OXYCODONE UR QL SCN: NEGATIVE
PCP UR QL SCN: NEGATIVE
PH UR STRIP.AUTO: 6.5 [PH] (ref 5–8)
PROPOXYPH UR QL: NEGATIVE
PROT UR QL STRIP: ABNORMAL
RBC # UR: ABNORMAL /HPF
REF LAB TEST METHOD: ABNORMAL
SP GR UR STRIP: 1 (ref 1–1.03)
SQUAMOUS #/AREA URNS HPF: ABNORMAL /HPF
TRICYCLICS UR QL SCN: NEGATIVE
UROBILINOGEN UR QL STRIP: ABNORMAL
WBC UR QL AUTO: ABNORMAL /HPF

## 2020-02-20 PROCEDURE — 80306 DRUG TEST PRSMV INSTRMNT: CPT

## 2020-02-20 PROCEDURE — 81003 URINALYSIS AUTO W/O SCOPE: CPT

## 2020-02-20 PROCEDURE — 81025 URINE PREGNANCY TEST: CPT | Performed by: OBSTETRICS & GYNECOLOGY

## 2020-02-20 PROCEDURE — 87086 URINE CULTURE/COLONY COUNT: CPT | Performed by: OBSTETRICS & GYNECOLOGY

## 2020-02-20 PROCEDURE — 99214 OFFICE O/P EST MOD 30 MIN: CPT | Performed by: OBSTETRICS & GYNECOLOGY

## 2020-02-20 PROCEDURE — 81015 MICROSCOPIC EXAM OF URINE: CPT

## 2020-02-20 NOTE — PROGRESS NOTES
Chief complaint: New OB visit    Nuha Bridges is a 44 y.o. year old .  Patient's last menstrual period was 10/18/2019 (exact date).  She presents to be seen to initiate prenatal care.  Patient without complaints or concerns today.  Denies any bleeding, has started feeling some fetal movement.  Patient is concerned about possible history of gestational diabetes however had not ever been screened in any of her other pregnancies.  Is checking blood sugars at home on a home glucose meter with fastings between 101 05, recommended that she continue to do this and bring in numbers for me to review at her next visit this may represent gestational diabetes and if numbers remain elevated she may need to start on medication.  Information was given about dietary changes that may help.  Patient also had  for her last delivery due to breech presentation.  Discussed with the patient possibility of , I felt like given that she has had 12 other vaginal delivery she would be a good candidate for vaginal birth after  section, patient going to continue thinking about this.  Patient would like to try to minimalize appointments with me as they are self-pay if possible, likely going to be working with midwife in the [facility].    Social History    Tobacco Use      Smoking status: Never Smoker      Smokeless tobacco: Never Used      The following portions of the patient's history were reviewed and updated as appropriate:no additional history reviewed and problem list.    Obstetric History  #: 1, Date: 98, Sex: None, Weight: None, GA: None, Delivery: None, Apgar1: None, Apgar5: None, Living: None, Birth Comments: None    #: 2, Date: 99, Sex: Male, Weight: 3544 g (7 lb 13 oz), GA: 40w0d, Delivery: Vaginal, Spontaneous, Apgar1: None, Apgar5: None, Living: Living, Birth Comments: None    #: 3, Date: 01, Sex: Female, Weight: 3629 g (8 lb), GA: 40w0d, Delivery: Vaginal, Spontaneous, Apgar1:  None, Apgar5: None, Living: Living, Birth Comments: None    #: 4, Date: 02, Sex: Female, Weight: 3856 g (8 lb 8 oz), GA: 40w0d, Delivery: Vaginal, Spontaneous, Apgar1: None, Apgar5: None, Living: Living, Birth Comments: None    #: 5, Date: 03, Sex: Male, Weight: 3600 g (7 lb 15 oz), GA: 40w0d, Delivery: Vaginal, Spontaneous, Apgar1: None, Apgar5: None, Living: Living, Birth Comments: None    #: 6, Date: 05, Sex: Female, Weight: 3175 g (7 lb), GA: 39w0d, Delivery: Vaginal, Spontaneous, Apgar1: None, Apgar5: None, Living: Living, Birth Comments: None    #: 7, Date: 07, Sex: Female, Weight: 3544 g (7 lb 13 oz), GA: 40w0d, Delivery: Vaginal, Spontaneous, Apgar1: None, Apgar5: None, Living: Living, Birth Comments: None    #: 8, Date: 08, Sex: Male, Weight: 4082 g (9 lb), GA: 41w0d, Delivery: Vaginal, Spontaneous, Apgar1: None, Apgar5: None, Living: Living, Birth Comments: None    #: 9, Date: 12/30/10, Sex: Male, Weight: 3629 g (8 lb), GA: 39w0d, Delivery: Vaginal, Spontaneous, Apgar1: None, Apgar5: None, Living: Living, Birth Comments: None    #: 10, Date: 12, Sex: Male, Weight: 4593 g (10 lb 2 oz), GA: 40w0d, Delivery: Vaginal, Spontaneous, Apgar1: None, Apgar5: None, Living: Living, Birth Comments: None    #: 11, Date: 14, Sex: Female, Weight: 4082 g (9 lb), GA: 40w0d, Delivery: Vaginal, Spontaneous, Apgar1: None, Apgar5: None, Living: Living, Birth Comments: None    #: 12, Date: 03/19/15, Sex: Female, Weight: 4082 g (9 lb), GA: 40w0d, Delivery: Vaginal, Spontaneous, Apgar1: None, Apgar5: None, Living: Living, Birth Comments: None    #: 13, Date: 16, Sex: Female, Weight: 4082 g (9 lb), GA: 40w0d, Delivery: Vaginal, Spontaneous, Apgar1: None, Apgar5: None, Living: Living, Birth Comments: None    #: 14, Date: 18, Sex: Male, Weight: 4338 g (9 lb 9 oz), GA: 40w6d, Delivery: , Low Transverse, Apgar1: 7, Apgar5: 9, Living: Living, Birth Comments: None    #: 15,  Date: None, Sex: None, Weight: None, GA: None, Delivery: None, Apgar1: None, Apgar5: None, Living: None, Birth Comments: None      Lab Review   No data reviewed    Imaging   No data reviewed    Assessment/Plan   ASSESSMENT  1. IUP at 17w6d doing well and appropriately progressing at this time.  Plan to schedule anatomy ultrasound.  Patient to return to see me in approximately 4 weeks patient to continue fingersticks at this time.  If patient does not see me in 4 weeks please return in 8weeks for follow-up.  Diagnoses and all orders for this visit:    Amenorrhea  -     POC Pregnancy, Urine    Pregnancy examination or test, positive result  -     OB Panel With HIV  -     Urine Culture - Urine, Urine, Clean Catch  -     Urine Drug Screen - Urine, Clean Catch; Future  -     Urinalysis With Microscopic - Urine, Clean Catch; Future    Multigravida of advanced maternal age in second trimester    Grand multipara        PLAN  1. Tests ordered today:  Orders Placed This Encounter   Procedures   • Urine Culture - Urine, Urine, Clean Catch   • OB Panel With HIV   • Urine Drug Screen - Urine, Clean Catch     Standing Status:   Future     Standing Expiration Date:   2/20/2021   • POC Pregnancy, Urine   • Urinalysis With Microscopic - Urine, Clean Catch     Standing Status:   Future     Standing Expiration Date:   2/20/2021     2. Medications prescribed today:  No orders of the defined types were placed in this encounter.      Follow up: 4 week(s)             This document has been electronically signed by Forest Rico DO on February 20, 2020 10:05 AM

## 2020-02-21 LAB — BACTERIA SPEC AEROBE CULT: NORMAL

## 2020-03-24 DIAGNOSIS — Z3A.19 19 WEEKS GESTATION OF PREGNANCY: Primary | ICD-10-CM

## 2020-03-26 DIAGNOSIS — Z3A.19 19 WEEKS GESTATION OF PREGNANCY: ICD-10-CM

## 2020-06-04 ENCOUNTER — ROUTINE PRENATAL (OUTPATIENT)
Dept: OBSTETRICS AND GYNECOLOGY | Facility: CLINIC | Age: 45
End: 2020-06-04

## 2020-06-04 VITALS — BODY MASS INDEX: 26.29 KG/M2 | WEIGHT: 178 LBS | DIASTOLIC BLOOD PRESSURE: 86 MMHG | SYSTOLIC BLOOD PRESSURE: 124 MMHG

## 2020-06-04 DIAGNOSIS — Z98.891 HISTORY OF CESAREAN DELIVERY: ICD-10-CM

## 2020-06-04 DIAGNOSIS — Z64.1 GRAND MULTIPARA: Chronic | ICD-10-CM

## 2020-06-04 DIAGNOSIS — Z3A.32 32 WEEKS GESTATION OF PREGNANCY: Primary | ICD-10-CM

## 2020-06-04 DIAGNOSIS — O09.523 MULTIGRAVIDA OF ADVANCED MATERNAL AGE IN THIRD TRIMESTER: ICD-10-CM

## 2020-06-04 PROBLEM — O24.415 GESTATIONAL DIABETES MELLITUS (GDM) IN THIRD TRIMESTER CONTROLLED ON ORAL HYPOGLYCEMIC DRUG: Status: ACTIVE | Noted: 2020-06-04

## 2020-06-04 PROCEDURE — 99213 OFFICE O/P EST LOW 20 MIN: CPT | Performed by: OBSTETRICS & GYNECOLOGY

## 2020-06-04 NOTE — PROGRESS NOTES
CC: Prenatal visit    Nuha Bridges is a 45 y.o.  at 32w6d.  Doing well.  Denies contractions, LOF, or VB.  Reports good FM.  Patient continues to take blood sugars at home.  Fasting blood sugars recent main elevated from 100-110 postprandial levels she states are all normal.  Given the elevated fasting levels I recommended that patient start on metformin 500 mg nightly.  Prescription was given patient uncertain if she wants to start taking medication she will decide over the next several days.  Patient will continue to see midwife return to see me in 4 weeks.    /86   Wt 80.7 kg (178 lb)   LMP 10/18/2019 (Exact Date)   BMI 26.29 kg/m²     Fundal Height (cm): 32 cm  Fetal Heart Rate: 130     Problems (from 20 to present)     Problem Noted Resolved    Gestational diabetes mellitus (GDM) in third trimester controlled on oral hypoglycemic drug 2020 by Forest Rico DO No    Overview Signed 2020 10:51 AM by Forest Rico DO     Patient given prescription for metformin 500 mg before bed.         History of  delivery 2020 by Forest Rico DO No    Overview Signed 2020 10:04 AM by Forest Rico DO     Considering  uncertain if she wants to do this.               A/P: Nuha Bridges is a 45 y.o.  at 32w6d.  Doing well overall stable at this time.  Now with A2 GDM plan to start on metformin 500 nightly patient return to see me in 4 weeks, sooner as needed.  Fetal kick count and  labor precautions given.  - RTC in 4 weeks  - Reviewed COVID-19 visitation policy  - Reviewed COVID-19 precautions     Diagnosis Plan   1. 32 weeks gestation of pregnancy     2. History of  delivery     3. Grand multipara     4. Multigravida of advanced maternal age in third trimester       Forest Rico DO  2020  10:53

## 2020-07-02 ENCOUNTER — ROUTINE PRENATAL (OUTPATIENT)
Dept: OBSTETRICS AND GYNECOLOGY | Facility: CLINIC | Age: 45
End: 2020-07-02

## 2020-07-02 VITALS — WEIGHT: 178 LBS | BODY MASS INDEX: 26.29 KG/M2 | SYSTOLIC BLOOD PRESSURE: 102 MMHG | DIASTOLIC BLOOD PRESSURE: 73 MMHG

## 2020-07-02 DIAGNOSIS — Z98.891 HISTORY OF CESAREAN DELIVERY: ICD-10-CM

## 2020-07-02 DIAGNOSIS — O09.523 MULTIGRAVIDA OF ADVANCED MATERNAL AGE IN THIRD TRIMESTER: ICD-10-CM

## 2020-07-02 DIAGNOSIS — Z64.1 GRAND MULTIPARA: Primary | Chronic | ICD-10-CM

## 2020-07-02 DIAGNOSIS — O24.415 GESTATIONAL DIABETES MELLITUS (GDM) IN THIRD TRIMESTER CONTROLLED ON ORAL HYPOGLYCEMIC DRUG: ICD-10-CM

## 2020-07-02 PROCEDURE — 99213 OFFICE O/P EST LOW 20 MIN: CPT | Performed by: OBSTETRICS & GYNECOLOGY

## 2020-07-02 NOTE — PROGRESS NOTES
CC: Prenatal visit    Nuah Bridges is a 45 y.o.  at 36w6d.  Doing well.  Denies contractions, LOF, or VB.  Reports good FM.  Discussed GBS testing today.  Patient states that she is only had this done once with previous pregnancies and patient is declining to have testing done as she has not had any complications with previous pregnancies.  Patient still desires a .  We will plan for induction of labor with Pitocin on  at 39 weeks and 0 days.  Patient has started taking metformin 500 mg at night and change diet to include more protein and blood sugars have improved with normal fasting blood sugars.    /73   Wt 80.7 kg (178 lb)   LMP 10/18/2019 (Exact Date)   BMI 26.29 kg/m²     Fundal Height (cm): 38 cm  Fetal Heart Rate: 150     Problems (from 20 to present)     Problem Noted Resolved    Gestational diabetes mellitus (GDM) in third trimester controlled on oral hypoglycemic drug 2020 by Forest Rico DO No    Overview Signed 2020 10:51 AM by Forest Rico DO     Patient given prescription for metformin 500 mg before bed.         History of  delivery 2020 by Forest Rico, DO No    Overview Signed 2020 10:04 AM by Forest Rico DO     Considering  uncertain if she wants to do this.               A/P: Nuha Bridges is a 45 y.o.  at 36w6d.  Doing well with appropriately progressing pregnancy at this time.  Plan for patient undergo induction of labor on .  Patient will continue to see midwife until that point.  Labor precautions given fetal kick count precautions given.  Patient declines GBS at this time.  Patient to return to see me for any concerns that may arise prior to induction.  Patient is  risk and benefits have been discussed.  -IOL on 2020  - Reviewed COVID-19 visitation policy  - Reviewed COVID-19 precautions     Diagnosis Plan   1. Grand multipara     2. Gestational diabetes  mellitus (GDM) in third trimester controlled on oral hypoglycemic drug     3. History of  delivery     4. Multigravida of advanced maternal age in third trimester       Forest Rico DO  2020  09:26

## 2020-07-17 ENCOUNTER — HOSPITAL ENCOUNTER (OUTPATIENT)
Dept: LABOR AND DELIVERY | Facility: HOSPITAL | Age: 45
Discharge: HOME | End: 2020-07-17

## 2020-07-17 ENCOUNTER — HOSPITAL ENCOUNTER (INPATIENT)
Facility: HOSPITAL | Age: 45
LOS: 1 days | Discharge: HOME | End: 2020-07-18
Attending: OBSTETRICS & GYNECOLOGY | Admitting: OBSTETRICS & GYNECOLOGY

## 2020-07-17 PROBLEM — O34.219 VAGINAL BIRTH AFTER CESAREAN (VBAC): Status: ACTIVE | Noted: 2020-07-17

## 2020-07-17 LAB
ABO GROUP BLD: NORMAL
AMPHET+METHAMPHET UR QL: NEGATIVE
AMPHETAMINES UR QL: NEGATIVE
BARBITURATES UR QL SCN: NEGATIVE
BENZODIAZ UR QL SCN: NEGATIVE
BLD GP AB SCN SERPL QL: NEGATIVE
BUPRENORPHINE SERPL-MCNC: NEGATIVE NG/ML
CANNABINOIDS SERPL QL: NEGATIVE
COCAINE UR QL: NEGATIVE
DEPRECATED RDW RBC AUTO: 40.3 FL (ref 37–54)
ERYTHROCYTE [DISTWIDTH] IN BLOOD BY AUTOMATED COUNT: 13.2 % (ref 12.3–15.4)
HCT VFR BLD AUTO: 38.2 % (ref 34–46.6)
HGB BLD-MCNC: 13.6 G/DL (ref 12–15.9)
HOLD SPECIMEN: NORMAL
Lab: NORMAL
MCH RBC QN AUTO: 30.5 PG (ref 26.6–33)
MCHC RBC AUTO-ENTMCNC: 35.6 G/DL (ref 31.5–35.7)
MCV RBC AUTO: 85.7 FL (ref 79–97)
METHADONE UR QL SCN: NEGATIVE
OPIATES UR QL: NEGATIVE
OXYCODONE UR QL SCN: NEGATIVE
PCP UR QL SCN: NEGATIVE
PLATELET # BLD AUTO: 173 10*3/MM3 (ref 140–450)
PMV BLD AUTO: 10.1 FL (ref 6–12)
PROPOXYPH UR QL: NEGATIVE
RBC # BLD AUTO: 4.46 10*6/MM3 (ref 3.77–5.28)
RH BLD: POSITIVE
T&S EXPIRATION DATE: NORMAL
TRICYCLICS UR QL SCN: NEGATIVE
WBC # BLD AUTO: 6.54 10*3/MM3 (ref 3.4–10.8)
WHOLE BLOOD HOLD SPECIMEN: NORMAL
WHOLE BLOOD HOLD SPECIMEN: NORMAL

## 2020-07-17 PROCEDURE — 10907ZC DRAINAGE OF AMNIOTIC FLUID, THERAPEUTIC FROM PRODUCTS OF CONCEPTION, VIA NATURAL OR ARTIFICIAL OPENING: ICD-10-PCS | Performed by: OBSTETRICS & GYNECOLOGY

## 2020-07-17 PROCEDURE — 25010000002 METHYLERGONOVINE MALEATE PER 0.2 MG: Performed by: OBSTETRICS & GYNECOLOGY

## 2020-07-17 PROCEDURE — 86900 BLOOD TYPING SEROLOGIC ABO: CPT | Performed by: OBSTETRICS & GYNECOLOGY

## 2020-07-17 PROCEDURE — 86850 RBC ANTIBODY SCREEN: CPT | Performed by: OBSTETRICS & GYNECOLOGY

## 2020-07-17 PROCEDURE — 86901 BLOOD TYPING SEROLOGIC RH(D): CPT | Performed by: OBSTETRICS & GYNECOLOGY

## 2020-07-17 PROCEDURE — 85027 COMPLETE CBC AUTOMATED: CPT | Performed by: OBSTETRICS & GYNECOLOGY

## 2020-07-17 PROCEDURE — 80306 DRUG TEST PRSMV INSTRMNT: CPT | Performed by: OBSTETRICS & GYNECOLOGY

## 2020-07-17 RX ORDER — SODIUM CHLORIDE, SODIUM LACTATE, POTASSIUM CHLORIDE, CALCIUM CHLORIDE 600; 310; 30; 20 MG/100ML; MG/100ML; MG/100ML; MG/100ML
125 INJECTION, SOLUTION INTRAVENOUS CONTINUOUS
Status: DISCONTINUED | OUTPATIENT
Start: 2020-07-17 | End: 2020-07-18 | Stop reason: HOSPADM

## 2020-07-17 RX ORDER — METHYLERGONOVINE MALEATE 0.2 MG/ML
INJECTION INTRAVENOUS
Status: DISPENSED
Start: 2020-07-17 | End: 2020-07-18

## 2020-07-17 RX ORDER — DOCUSATE SODIUM 100 MG/1
100 CAPSULE, LIQUID FILLED ORAL 2 TIMES DAILY
Status: DISCONTINUED | OUTPATIENT
Start: 2020-07-17 | End: 2020-07-18 | Stop reason: HOSPADM

## 2020-07-17 RX ORDER — SODIUM CHLORIDE, SODIUM LACTATE, POTASSIUM CHLORIDE, CALCIUM CHLORIDE 600; 310; 30; 20 MG/100ML; MG/100ML; MG/100ML; MG/100ML
INJECTION, SOLUTION INTRAVENOUS
Status: DISPENSED
Start: 2020-07-17 | End: 2020-07-17

## 2020-07-17 RX ORDER — SODIUM CHLORIDE 0.9 % (FLUSH) 0.9 %
10 SYRINGE (ML) INJECTION AS NEEDED
Status: DISCONTINUED | OUTPATIENT
Start: 2020-07-17 | End: 2020-07-17 | Stop reason: HOSPADM

## 2020-07-17 RX ORDER — LIDOCAINE HYDROCHLORIDE 10 MG/ML
5 INJECTION, SOLUTION EPIDURAL; INFILTRATION; INTRACAUDAL; PERINEURAL AS NEEDED
Status: DISCONTINUED | OUTPATIENT
Start: 2020-07-17 | End: 2020-07-17 | Stop reason: HOSPADM

## 2020-07-17 RX ORDER — IBUPROFEN 600 MG/1
600 TABLET ORAL EVERY 8 HOURS PRN
Status: DISCONTINUED | OUTPATIENT
Start: 2020-07-17 | End: 2020-07-18 | Stop reason: HOSPADM

## 2020-07-17 RX ORDER — CARBOPROST TROMETHAMINE 250 UG/ML
250 INJECTION, SOLUTION INTRAMUSCULAR AS NEEDED
Status: DISCONTINUED | OUTPATIENT
Start: 2020-07-17 | End: 2020-07-17 | Stop reason: HOSPADM

## 2020-07-17 RX ORDER — SODIUM CHLORIDE 0.9 % (FLUSH) 0.9 %
1-10 SYRINGE (ML) INJECTION AS NEEDED
Status: DISCONTINUED | OUTPATIENT
Start: 2020-07-17 | End: 2020-07-18 | Stop reason: HOSPADM

## 2020-07-17 RX ORDER — METHYLERGONOVINE MALEATE 0.2 MG/ML
200 INJECTION INTRAVENOUS ONCE AS NEEDED
Status: COMPLETED | OUTPATIENT
Start: 2020-07-17 | End: 2020-07-17

## 2020-07-17 RX ORDER — MISOPROSTOL 200 UG/1
800 TABLET ORAL AS NEEDED
Status: DISCONTINUED | OUTPATIENT
Start: 2020-07-17 | End: 2020-07-17 | Stop reason: HOSPADM

## 2020-07-17 RX ORDER — HYDROCORTISONE 25 MG/G
1 CREAM TOPICAL AS NEEDED
Status: DISCONTINUED | OUTPATIENT
Start: 2020-07-17 | End: 2020-07-18 | Stop reason: HOSPADM

## 2020-07-17 RX ORDER — BISACODYL 10 MG
10 SUPPOSITORY, RECTAL RECTAL DAILY PRN
Status: DISCONTINUED | OUTPATIENT
Start: 2020-07-18 | End: 2020-07-18 | Stop reason: HOSPADM

## 2020-07-17 RX ORDER — OXYTOCIN/0.9 % SODIUM CHLORIDE 30/500 ML
2 PLASTIC BAG, INJECTION (ML) INTRAVENOUS
Status: DISCONTINUED | OUTPATIENT
Start: 2020-07-17 | End: 2020-07-18 | Stop reason: HOSPADM

## 2020-07-17 RX ADMIN — METHYLERGONOVINE MALEATE 200 MCG: 0.2 INJECTION, SOLUTION INTRAMUSCULAR; INTRAVENOUS at 17:01

## 2020-07-17 RX ADMIN — OXYTOCIN-SODIUM CHLORIDE 0.9% IV SOLN 30 UNIT/500ML 2 MILLI-UNITS/MIN: 30-0.9/5 SOLUTION at 07:13

## 2020-07-17 RX ADMIN — MISOPROSTOL 800 MCG: 200 TABLET ORAL at 17:01

## 2020-07-17 NOTE — PROGRESS NOTES
Cervix at 4/50/-2.  Amniotomy performed with clear fluid noted.  Plan to continue routine labor care and continue to increase Pitocin, anticipate vaginal delivery.

## 2020-07-17 NOTE — L&D DELIVERY NOTE
[facility]  Vaginal Delivery Note    Delivery     Delivery:    Successful vaginal birth after  section   YOB: 2020    Time of Birth: 4:45 PM      Anesthesia:    None   Delivering clinician:   Forest Rico,       Delivery narrative: Patient progressed to complete complete +2 with good maternal effort delivered a male infant over an intact perineum in the OA presentation.  Right anterior shoulder followed by posterior shoulder and corpus delivered infant was placed on maternal abdomen.  Cord was clamped x2 and cut by father of baby.  Placenta delivered intact without difficulty.  Given that the patient was a grand multipara she was given 800 mcg of Cytotec p.o. and 200 mcg of Methergine IM.  Bleeding was minimal at the time I left the room.  Mother and infant were stable when I left the room.    Complications  none    Infant    Findings: male  infant     Infant observations: Weight: 3830 g (8 lb 7.1 oz)   Length: 21  in  Observations/Comments:         Apgars:    @ 1 minute /       @ 5 minutes     Placenta, Cord, and Fluid    Placenta delivered     at         Cord: Three-vessel    present.   Nuchal Cord?  no   Cord blood obtained:   Yes   Cord gases obtained:    No     Repair    Episiotomy: Not recorded    Lacerations: No   Estimated Blood Loss:  200mL           Disposition  Mother to Mother Baby/Postpartum  in stable condition currently.  Baby to remains with mom  in stable condition currently.

## 2020-07-17 NOTE — H&P
[facility]  Obstetric History and Physical    Chief Complaint: Scheduled induction        Patient is a 45 y.o. female  currently at 39w0d, who presents for IOL secondary to AMA. Patient is desiring TOLAC. +FM. Denies LOF, VB. Is having some contractions. No other concerns or complaints at this time.    Her prenatal care is complicated by care at home with midwife, Previous       She was consented for VAGINAL BIRTH AFTER  ().   The risks that were discussed included, but were not limited to:    1. She has had one or more  sections.   2. She understands she has the option of undergoing an elective repeat  section or attempting a vaginal birth after a .   3. She understands approximately 65% of women who undergo  will successfully deliver vaginally.  4. She understands that the risk of a uterine rupture during  in someone who has had a prior incision in the noncontracting part of the uterus, may be as high as 4% (4 in 100 births).  5. She understands that  is associated with a higher risk of harm to the baby than herself.  6. The most serious risk of  is rupture of the uterus, bladder, intestines, or other organs, which could require antibiotics, blood transfusion, hysterectomy, or other major surgical repair.  7. She understands that if her uterus ruptures during her , there may not be sufficient time to operate and to prevent the death of or permanent brain injury to her baby or herself.  8. She understands that the decision to have a  is entirely her own, and the option of an elective repeat  has been discussed with her.  9. She understands that  carries a lower risk to her than does .  10. She understands that if she delivers vaginally, she most likely will have fewer problems after delivery and a shorter hospital stay than if she has a repeat .  11. She understands that during her , the use of  pitocin to make her uterus contract may be necessary to assist her in her vaginal delivery, and the risks and benefits of this drug have been explained.   12. She understands that if she chooses  and ends up having a  during labor, she has a greater risk of problems than if she had had an elective .    All of her questions were answered fully. She left with a very clear understanding of a vaginal birth after .      Obstetric History   #: 1, Date: 98, Sex: None, Weight: None, GA: None, Delivery: None, Apgar1: None, Apgar5: None, Living: None, Birth Comments: None    #: 2, Date: 99, Sex: Male, Weight: 3544 g (7 lb 13 oz), GA: 40w0d, Delivery: Vaginal, Spontaneous, Apgar1: None, Apgar5: None, Living: Living, Birth Comments: None    #: 3, Date: 01, Sex: Female, Weight: 3629 g (8 lb), GA: 40w0d, Delivery: Vaginal, Spontaneous, Apgar1: None, Apgar5: None, Living: Living, Birth Comments: None    #: 4, Date: 02, Sex: Female, Weight: 3856 g (8 lb 8 oz), GA: 40w0d, Delivery: Vaginal, Spontaneous, Apgar1: None, Apgar5: None, Living: Living, Birth Comments: None    #: 5, Date: 03, Sex: Male, Weight: 3600 g (7 lb 15 oz), GA: 40w0d, Delivery: Vaginal, Spontaneous, Apgar1: None, Apgar5: None, Living: Living, Birth Comments: None    #: 6, Date: 05, Sex: Female, Weight: 3175 g (7 lb), GA: 39w0d, Delivery: Vaginal, Spontaneous, Apgar1: None, Apgar5: None, Living: Living, Birth Comments: None    #: 7, Date: 07, Sex: Female, Weight: 3544 g (7 lb 13 oz), GA: 40w0d, Delivery: Vaginal, Spontaneous, Apgar1: None, Apgar5: None, Living: Living, Birth Comments: None    #: 8, Date: 08, Sex: Male, Weight: 4082 g (9 lb), GA: 41w0d, Delivery: Vaginal, Spontaneous, Apgar1: None, Apgar5: None, Living: Living, Birth Comments: None    #: 9, Date: 12/30/10, Sex: Male, Weight: 3629 g (8 lb), GA: 39w0d, Delivery: Vaginal, Spontaneous, Apgar1: None, Apgar5: None, Living: Living,  Birth Comments: None    #: 10, Date: 12, Sex: Male, Weight: 4593 g (10 lb 2 oz), GA: 40w0d, Delivery: Vaginal, Spontaneous, Apgar1: None, Apgar5: None, Living: Living, Birth Comments: None    #: 11, Date: 14, Sex: Female, Weight: 4082 g (9 lb), GA: 40w0d, Delivery: Vaginal, Spontaneous, Apgar1: None, Apgar5: None, Living: Living, Birth Comments: None    #: 12, Date: 03/19/15, Sex: Female, Weight: 4082 g (9 lb), GA: 40w0d, Delivery: Vaginal, Spontaneous, Apgar1: None, Apgar5: None, Living: Living, Birth Comments: None    #: 13, Date: 16, Sex: Female, Weight: 4082 g (9 lb), GA: 40w0d, Delivery: Vaginal, Spontaneous, Apgar1: None, Apgar5: None, Living: Living, Birth Comments: None    #: 14, Date: 18, Sex: Male, Weight: 4338 g (9 lb 9 oz), GA: 40w6d, Delivery: , Low Transverse, Apgar1: 7, Apgar5: 9, Living: Living, Birth Comments: None    #: 15, Date: None, Sex: None, Weight: None, GA: None, Delivery: None, Apgar1: None, Apgar5: None, Living: None, Birth Comments: None       The following portions of the patients history were reviewed and updated as appropriate: current medications, allergies, past medical history, past surgical history, past family history, past social history and problem list .       Prenatal Information:  Prenatal Results     POC Urine Glucose/Protein     Test Value Reference Range Date Time    Urine Glucose        Urine Protein              Initial Prenatal Labs     Test Value Reference Range Date Time    Hemoglobin        Hematocrit        Platelets 173 10*3/mm3 140 - 450 20 0534    Rubella IgG 21.0 IU/mL 0.0 - 9.9 18 1423      Immune  Immune 18 1423    Hepatitis B SAg Negative  Negative 18 1423    Hepatitis C Ab Negative  Negative 18 1423    RPR Non-Reactive  Non-Reactive 18 1423    ABO A   11/20/18 1649    Rh Positive   18 1649    Antibody Screen        HIV Negative  Negative 18 1423    Urine Culture <25,000 CFU/mL  Mixed Mary Alice Isolated   02/20/20 1037    Gonorrhea        Chlamydia        TSH 1.530 mIU/mL 0.460 - 4.680 05/17/18 1423          2nd and 3rd Trimester     Test Value Reference Range Date Time    Hemoglobin (repeated) 13.6 g/dL 12.0 - 15.9 07/17/20 0534    Hematocrit (repeated) 38.2 % 34.0 - 46.6 07/17/20 0534    GCT        Antibody Screen (repeated)        GTT Fasting        GTT 1 Hr        GTT 2 Hr        GTT 3 Hr        Group B Strep              Drug Screening     Test Value Reference Range Date Time    Amphetamine Screen Negative  Negative 07/17/20 0534      Negative  Negative 02/20/20 1037    Barbiturate Screen Negative  Negative 07/17/20 0534      Negative  Negative 02/20/20 1037    Benzodiazepine Screen Negative  Negative 07/17/20 0534      Negative  Negative 02/20/20 1037    Methadone Screen Negative  Negative 07/17/20 0534      Negative  Negative 02/20/20 1037    Phencyclidine Screen Negative  Negative 07/17/20 0534      Negative  Negative 02/20/20 1037    Opiates Screen Negative  Negative 07/17/20 0534      Negative  Negative 02/20/20 1037    THC Screen Negative  Negative 07/17/20 0534      Negative  Negative 02/20/20 1037    Cocaine Screen Negative  Negative 07/17/20 0534      Negative  Negative 02/20/20 1037    Propoxyphene Screen Negative  Negative 07/17/20 0534      Negative  Negative 02/20/20 1037    Buprenorphine Screen Negative  Negative 07/17/20 0534      Negative  Negative 02/20/20 1037    Methamphetamine Screen Negative  Negative 07/17/20 0534      Negative  Negative 02/20/20 1037    Oxycodone Screen Negative  Negative 07/17/20 0534      Negative  Negative 02/20/20 1037    Tricyclic Antidepressants Screen Negative  Negative 07/17/20 0534      Negative  Negative 02/20/20 1037          Other (Risk screening)     Test Value Reference Range Date Time    Varicella IgG        Parvovirus IgG        CMV IgG        Cystic Fibrosis        Hemoglobin electrophoresis        NIPT        MSAFP-4        AFP (for  NTD only)                  External Prenatal Results     Pregnancy Outside Results - Transcribed From Office Records - See Scanned Records For Details     Test Value Date Time    Hgb 13.6 g/dL 07/17/20 0534    Hct 38.2 % 07/17/20 0534    ABO A  11/20/18 1649    Rh Positive  11/20/18 1649    Antibody Screen Negative  11/20/18 1649    Glucose Fasting GTT       Glucose Tolerance Test 1 hour       Glucose Tolerance Test 3 hour       Gonorrhea (discrete)       Chlamydia (discrete)       RPR Non-Reactive  05/17/18 1423    VDRL       Syphilis Antibody       Rubella 21.0 IU/mL 05/17/18 1423      Immune  05/17/18 1423    HBsAg Negative  05/17/18 1423    Herpes Simplex Virus PCR       Herpes Simplex VIrus Culture       HIV Negative  05/17/18 1423    Hep C RNA Quant PCR       Hep C Antibody Negative  05/17/18 1423    AFP       Group B Strep       GBS Susceptibility to Clindamycin       GBS Susceptibility to Erythromycin       Fetal Fibronectin       Genetic Testing, Maternal Blood             Drug Screening     Test Value Date Time    Urine Drug Screen       Amphetamine Screen Negative  07/17/20 0534      Negative  02/20/20 1037    Barbiturate Screen Negative  07/17/20 0534      Negative  02/20/20 1037    Benzodiazepine Screen Negative  07/17/20 0534      Negative  02/20/20 1037    Methadone Screen Negative  07/17/20 0534      Negative  02/20/20 1037    Phencyclidine Screen Negative  07/17/20 0534      Negative  02/20/20 1037    Opiates Screen Negative  07/17/20 0534      Negative  02/20/20 1037    THC Screen Negative  07/17/20 0534      Negative  02/20/20 1037    Cocaine Screen       Propoxyphene Screen Negative  07/17/20 0534      Negative  02/20/20 1037    Buprenorphine Screen Negative  07/17/20 0534      Negative  02/20/20 1037    Methamphetamine Screen       Oxycodone Screen Negative  07/17/20 0534      Negative  02/20/20 1037    Tricyclic Antidepressants Screen Negative  07/17/20 0534      Negative  02/20/20 1037                  Past OB History:     OB History    Para Term  AB Living   15 13 13 0 1 13   SAB TAB Ectopic Molar Multiple Live Births   1 0 0 0 0 13      # Outcome Date GA Lbr Otto/2nd Weight Sex Delivery Anes PTL Lv   15 Current            14 Term 18 40w6d  4338 g (9 lb 9 oz) M CS-LTranv Spinal N JOAN      Name: MARCELA BEJARANOFERNANDOOREN      Apgar1: 7  Apgar5: 9   13 Term 16 40w0d  4082 g (9 lb) F Vag-Spont   JOAN      Name: Dustin   12 Term 03/19/15 40w0d  4082 g (9 lb) F Vag-Spont   JOAN      Name: Pennie   11 Term 14 40w0d  4082 g (9 lb) F Vag-Spont   JOAN      Name: Linda   10 Term 12 40w0d  4593 g (10 lb 2 oz) M Vag-Spont   JOAN      Name: Edwin   9 Term 12/30/10 39w0d  3629 g (8 lb) M Vag-Spont   JOAN      Name: Lupillo   8 Term 08 41w0d  4082 g (9 lb) M Vag-Spont   JOAN      Name: Kameron   7 Term 07 40w0d  3544 g (7 lb 13 oz) F Vag-Spont   JOAN      Name: Elsy   6 Term 05 39w0d  3175 g (7 lb) F Vag-Spont   JOAN      Name: Cari   5 Term 03 40w0d  3600 g (7 lb 15 oz) M Vag-Spont   JOAN      Name: [place]   4 Term 02 40w0d  3856 g (8 lb 8 oz) F Vag-Spont   JOAN      Name: Olga   3 Term 01 40w0d  3629 g (8 lb) F Vag-Spont   JOAN      Name: Jovanna   2 Term 99 40w0d  3544 g (7 lb 13 oz) M Vag-Spont   JOAN      Name: Grupo   1 SAB 98                ALLERGIES:   No Known Allergies     Home Medications:     Prior to Admission medications    Medication Sig Start Date End Date Taking? Authorizing Provider   calcium carbonate (OS-MICHAEL) 600 MG tablet Take 600 mg by mouth Daily.    Provider, MD Ady   metFORMIN (Glucophage) 500 MG tablet Take 1 tablet by mouth every night at bedtime. 20  Forest Rico,    Prenatal Vit-Fe Fumarate-FA (PRENATAL, CLASSIC, VITAMIN) 28-0.8 MG tablet tablet Take  by mouth Daily.    Provider, Historical, MD       Past Medical History: Past Medical History:   Diagnosis Date   • AMA (advanced maternal  age) multigravida 35+ 2018   • Anemia of pregnancy 2018   • Anxiety    • Grand multipara 2018      Past Surgical History Past Surgical History:   Procedure Laterality Date   •  SECTION N/A 2018    Procedure:  SECTION PRIMARY;  Surgeon: Aly Veloz MD;  Location: [facility] LABOR DELIVERY;  Service: Obstetrics/Gynecology      Family History: Family History   Problem Relation Age of Onset   • Hypertension Father    • Diabetes Father    • Diabetes Mother    • Hypertension Mother       Social History:  reports that she has never smoked. She has never used smokeless tobacco.   reports that she does not drink alcohol.   reports that she does not use drugs.        Review of Systems                                                                                                                  Neuro no history of brain tumor    HENT no history of ear tumors    Eye no history of retinal tumors    Pulmonary no history of lung tumors    Cardiac no history of cardiac tumors    GI: No history of small bowel tumors    Musculoskeletal: No history of skeletal muscle tumors    Endocrine: No history of adrenal tumors    Lymphatic: No history of Hodgkin's disease    Renal: No history of renal cancer      Objective     There were no vitals filed for this visit.    OBGyn Exam  Constitutional: Appears to be in no acute distress; Eyes: sclera normal; Endocrine system: thyroid palpate is normal; Pulmonary system: lungs clear; Cardiovascular system: heart regular rate and rhythm; Gastrointestinal system: abdomen soft nontender, active bowel sounds; Urologic system: CVA negative; Psychiatric: appropriate insight; Neurologic: gait within normal limits. Cat 1 FHR, Cephalic by US. Cervix:      Last Labs  Lab Results   Component Value Date    WBC 6.54 2020    RBC 4.46 2020    HGB 13.6 2020    HCT 38.2 2020    MCV 85.7 2020    MCH 30.5 2020    MCHC 35.6 2020     RDW 13.2 2020    RDWSD 40.3 2020    MPV 10.1 2020     2020        Lab Results   Component Value Date    GLUCOSE 75 2018    BUN 11 2018    CREATININE 0.59 2018     (L) 2018    K 3.5 2018     2018    CO2 18.0 (L) 2018    CALCIUM 9.4 2018    PROTEINTOT 7.4 2018    ALBUMIN 4.10 2018    ALT 10 2018    AST 22 2018    ALKPHOS 200 (H) 2018    BILITOT 0.6 2018    EGFRIFNONA 111 2018    GLOB 3.3 2018    AGRATIO 1.2 2018    BCR 18.6 2018    ANIONGAP 14.0 2018       No results found for: HCGQUAL      Assessment/Plan:  1. 45 y.o. [id number]. Patient without complaints at this time. Reassuring fetal status. Plan to start IOL with pitocin. Routine labor care and anticipate . Close eye to hemorrhage and uterine rupture.      Nuha Bridges and I have discussed pain goals for this hospitalization after reviewing her current clinical condition, medical history and prior pain experiences.  The goal is to keep her pain level tolerable.  To help achieve this, I plan to offer IV pain medications and epidural if needed.           This document has been electronically signed by Forest Rico DO on 2020 06:30

## 2020-07-17 NOTE — NURSING NOTE
Nursing in room to discuss GBS status of unknown and orders for PCN every 4 hours while in labor. Patient and her  both refused medication at this time. Dr Rico notified of patients decision at this time.

## 2020-07-18 VITALS
DIASTOLIC BLOOD PRESSURE: 69 MMHG | OXYGEN SATURATION: 97 % | TEMPERATURE: 97.7 F | BODY MASS INDEX: 26.29 KG/M2 | WEIGHT: 178 LBS | SYSTOLIC BLOOD PRESSURE: 115 MMHG | RESPIRATION RATE: 16 BRPM | HEART RATE: 69 BPM

## 2020-07-18 LAB
BASOPHILS # BLD AUTO: 0.03 10*3/MM3 (ref 0–0.2)
BASOPHILS NFR BLD AUTO: 0.3 % (ref 0–1.5)
DEPRECATED RDW RBC AUTO: 41.2 FL (ref 37–54)
EOSINOPHIL # BLD AUTO: 0.04 10*3/MM3 (ref 0–0.4)
EOSINOPHIL NFR BLD AUTO: 0.3 % (ref 0.3–6.2)
ERYTHROCYTE [DISTWIDTH] IN BLOOD BY AUTOMATED COUNT: 13.4 % (ref 12.3–15.4)
HCT VFR BLD AUTO: 40.1 % (ref 34–46.6)
HGB BLD-MCNC: 14.4 G/DL (ref 12–15.9)
IMM GRANULOCYTES # BLD AUTO: 0.03 10*3/MM3 (ref 0–0.05)
IMM GRANULOCYTES NFR BLD AUTO: 0.3 % (ref 0–0.5)
LYMPHOCYTES # BLD AUTO: 1.44 10*3/MM3 (ref 0.7–3.1)
LYMPHOCYTES NFR BLD AUTO: 12.5 % (ref 19.6–45.3)
MCH RBC QN AUTO: 30.9 PG (ref 26.6–33)
MCHC RBC AUTO-ENTMCNC: 35.9 G/DL (ref 31.5–35.7)
MCV RBC AUTO: 86.1 FL (ref 79–97)
MONOCYTES # BLD AUTO: 1.39 10*3/MM3 (ref 0.1–0.9)
MONOCYTES NFR BLD AUTO: 12.1 % (ref 5–12)
NEUTROPHILS NFR BLD AUTO: 74.5 % (ref 42.7–76)
NEUTROPHILS NFR BLD AUTO: 8.56 10*3/MM3 (ref 1.7–7)
NRBC BLD AUTO-RTO: 0 /100 WBC (ref 0–0.2)
PLATELET # BLD AUTO: 174 10*3/MM3 (ref 140–450)
PMV BLD AUTO: 9.8 FL (ref 6–12)
RBC # BLD AUTO: 4.66 10*6/MM3 (ref 3.77–5.28)
WBC # BLD AUTO: 11.49 10*3/MM3 (ref 3.4–10.8)

## 2020-07-18 PROCEDURE — 99024 POSTOP FOLLOW-UP VISIT: CPT | Performed by: OBSTETRICS & GYNECOLOGY

## 2020-07-18 PROCEDURE — 85025 COMPLETE CBC W/AUTO DIFF WBC: CPT | Performed by: OBSTETRICS & GYNECOLOGY

## 2020-07-18 RX ORDER — IBUPROFEN 600 MG/1
600 TABLET ORAL EVERY 8 HOURS PRN
Qty: 60 TABLET | Refills: 2 | Status: SHIPPED | OUTPATIENT
Start: 2020-07-18

## 2020-07-18 RX ORDER — ACETAMINOPHEN 325 MG/1
650 TABLET ORAL EVERY 4 HOURS PRN
Qty: 100 TABLET | Refills: 2 | Status: SHIPPED | OUTPATIENT
Start: 2020-07-18 | End: 2021-07-18

## 2020-07-18 NOTE — PLAN OF CARE
Problem: Patient Care Overview  Goal: Plan of Care Review  Outcome: Outcome(s) achieved  Flowsheets (Taken 2020 1318)  Outcome Summary: vss, voids, passing flatus, ambulates with no assistance, fundus firm and 1>umb, perineum intact, rubra small with no clots, vas 2, breastfeeding  and going very well. patient ready for discharge

## 2020-07-18 NOTE — PROGRESS NOTES
[facility]  Nuha Bridges  : 1975  MRN: [id number]  CSN: [id number]    Postpartum Day #1  Subjective   Without complaints today, pain is well controlled.  Bleeding is minimal.  She is ambulating without difficulty, tolerating regular diet.  She is urinating without difficulty as well.     Objective     Min/max vitals past 24 hours:   Temp  Min: 97.7 °F (36.5 °C)  Max: 99.5 °F (37.5 °C)  BP  Min: 108/55  Max: 163/85  Pulse  Min: 64  Max: 91  Pulse  Min: 64  Max: 91        Abdomen: soft, nontender, bowel sounds normal, no masses   fundus firm and nontender   Calves: Nontender, no cords palpable   Pelvic: deferred     Lab Results   Component Value Date    WBC 11.49 (H) 2020    HGB 14.4 2020    HCT 40.1 2020    MCV 86.1 2020     2020    RH Positive 2020    HEPBSAG Negative 2018        Assessment   1. Postpartum Day #1 S/P successful , doing well and recovering appropriately.  Appropriate drop in H&H and vital signs stable.     Plan   1. Continue routine postpartum care  2. Encourage ambulation and breast-feeding  3. Regular diet  4. Discharged home today          This document has been electronically signed by Forest Rico DO on 2020 08:47

## 2020-07-18 NOTE — DISCHARGE SUMMARY
[facility]  Nuha Bridges  : 1975  MRN: [id number]  CSN: [id number]    Discharge Summary:    Date of Admission: 2020  Date of Discharge:  2020    Admitting Diagnosis:  1. Intrauterine pregnancy @ 39w0d  2. for induction of labor     Discharge Diagnosis:  1. Status post        History and Hospital Course:  Patient is a   who presents for scheduled induction of labor.  Her pregnancy was complicated by A2 GDM.  Please see History and Physical for full details.       She was admitted and progressed in labor with pitocin augmentation to completely dilated. She had a vaginal delivery of a  viable male   infant who weighed 3830 g (8 lb 7.1 oz)  and APGARs of        APGARS  One minute Five minutes Ten minutes Fifteen minutes Twenty minutes   Skin color: 1   1             Heart rate: 2   2             Grimace: 2   2              Muscle tone: 2   2              Breathin   2              Totals: 8   9              . No immediate complications were encountered. Please see procedure note for full details.      Her postpartum course has been unremarkable. She had no signs or symptoms of acute blood loss anemia. She was ambulating well, voiding without difficulty and lochia was within normal limits. She was breast feeding without difficulty. She was stable for discharge on postpartum day 1.      Precautions and instructions were discussed with her including but not limited to maintaining a regular diet at home, practicing local hygiene, pelvic rest, and signs and symptoms to report including heavy bleeding, frequent passage of clots, fainting or dizziness, foul odor of lochia, increasing pain, fever, or any other concerns.    She was asked to follow up in the office in 4 weeks.    Condition: Stable  Discharge Disposition: home  Discharge Diet:   Diet Instructions     Diet: Regular      Discharge Diet:  Regular        Activity at Discharge:   Activity Instructions     Bathing Restrictions       Type of Restriction:  Bathing    Bathing Restrictions:  Other    Explain Bathing Restrictions:  No soaking in bathtub for 4 weeks, showers are fine.    Driving Restrictions      Type of Restriction:  Driving    Driving Restrictions:  No Driving (Time Limited)    Length:  Other    Indicate Length of Restriction:  No driving for 1 week or while on narcotic pain medications. Riding is car is fine.    Lifting Restrictions      Type of Restriction:  Lifting    Lifting Restrictions:  Other    Explain Lifing Restrictions:  No lifting more than infant and baby carrier together for 6 weeks.    Pelvic Rest      Nothing in the vagina for 6 weeks to include tampons or intercourse.    Sexual Activity Restrictions      Type of Restriction:  Sex    Explain Sexual Activity Restrictions:  No sexual intercourse for at least 6 weeks        Discharge Medications:       Discharge Medications      New Medications      Instructions Start Date   acetaminophen 325 MG tablet  Commonly known as:  Tylenol   650 mg, Oral, Every 4 Hours PRN      ibuprofen 600 MG tablet  Commonly known as:  ADVIL,MOTRIN   600 mg, Oral, Every 8 Hours PRN         Continue These Medications      Instructions Start Date   calcium carbonate 600 MG tablet  Commonly known as:  OS-MICHAEL   600 mg, Oral, Daily      prenatal (CLASSIC) vitamin  tablet  Generic drug:  prenatal vitamin   Oral, Daily         Stop These Medications    metFORMIN 500 MG tablet  Commonly known as:  Glucophage          Patient will restart all home medications including prenatal vitamins.        This document has been electronically signed by Forest Rico DO on July 18, 2020 08:53

## (undated) DEVICE — SPNG LAP 18X18IN LF STRL PK/5

## (undated) DEVICE — GLV SURG SENSICARE GREEN W/ALOE PF LF 6.5 STRL

## (undated) DEVICE — PK C/SECT 60

## (undated) DEVICE — GLV SURG MICROTOUCH LTX SZ7 STRL

## (undated) DEVICE — SUT GUT CHRM 1 CTX 36IN 905H

## (undated) DEVICE — DRSNG TELFA PAD NONADH STR 1S 3X4IN

## (undated) DEVICE — SUT VIC PLS 0 CTX 36IN UD VCP978H

## (undated) DEVICE — GARMENT,MEDLINE,DVT,INT,CALF,MED, GEN2: Brand: MEDLINE

## (undated) DEVICE — TRY SPINE PENCAN 24GA X4IN

## (undated) DEVICE — PAD,ABDOMINAL,8"X10",ST,LF: Brand: MEDLINE

## (undated) DEVICE — SUT ETHLN 4/0 30IN 626H